# Patient Record
Sex: FEMALE | Race: ASIAN | NOT HISPANIC OR LATINO | Employment: UNEMPLOYED | URBAN - METROPOLITAN AREA
[De-identification: names, ages, dates, MRNs, and addresses within clinical notes are randomized per-mention and may not be internally consistent; named-entity substitution may affect disease eponyms.]

---

## 2020-09-04 ENCOUNTER — COMMUNICATION - HEALTHEAST (OUTPATIENT)
Dept: SCHEDULING | Facility: CLINIC | Age: 45
End: 2020-09-04

## 2020-09-24 ENCOUNTER — RECORDS - HEALTHEAST (OUTPATIENT)
Dept: MAMMOGRAPHY | Facility: CLINIC | Age: 45
End: 2020-09-24

## 2020-09-24 ENCOUNTER — OFFICE VISIT - HEALTHEAST (OUTPATIENT)
Dept: FAMILY MEDICINE | Facility: CLINIC | Age: 45
End: 2020-09-24

## 2020-09-24 DIAGNOSIS — Z23 NEED FOR TDAP VACCINATION: ICD-10-CM

## 2020-09-24 DIAGNOSIS — J45.20 MILD INTERMITTENT ASTHMA WITHOUT COMPLICATION: ICD-10-CM

## 2020-09-24 DIAGNOSIS — Z00.00 ENCOUNTER FOR MEDICAL EXAMINATION TO ESTABLISH CARE: ICD-10-CM

## 2020-09-24 DIAGNOSIS — Z12.31 VISIT FOR SCREENING MAMMOGRAM: ICD-10-CM

## 2020-09-24 DIAGNOSIS — Z23 NEED FOR IMMUNIZATION AGAINST INFLUENZA: ICD-10-CM

## 2020-09-24 DIAGNOSIS — Z13.220 LIPID SCREENING: ICD-10-CM

## 2020-09-24 DIAGNOSIS — E78.5 HYPERLIPIDEMIA, UNSPECIFIED HYPERLIPIDEMIA TYPE: ICD-10-CM

## 2020-09-24 DIAGNOSIS — Z13.1 SCREENING FOR DIABETES MELLITUS: ICD-10-CM

## 2020-09-24 DIAGNOSIS — B18.1 CHRONIC VIRAL HEPATITIS B WITHOUT DELTA AGENT AND WITHOUT COMA (H): ICD-10-CM

## 2020-09-24 DIAGNOSIS — Z12.31 ENCOUNTER FOR SCREENING MAMMOGRAM FOR MALIGNANT NEOPLASM OF BREAST: ICD-10-CM

## 2020-09-24 DIAGNOSIS — Z97.5 IUD (INTRAUTERINE DEVICE) IN PLACE: ICD-10-CM

## 2020-09-24 LAB
ALBUMIN SERPL-MCNC: 3.7 G/DL (ref 3.5–5)
ALP SERPL-CCNC: 76 U/L (ref 45–120)
ALT SERPL W P-5'-P-CCNC: 22 U/L (ref 0–45)
ANION GAP SERPL CALCULATED.3IONS-SCNC: 10 MMOL/L (ref 5–18)
AST SERPL W P-5'-P-CCNC: 19 U/L (ref 0–40)
BILIRUB SERPL-MCNC: 0.3 MG/DL (ref 0–1)
BUN SERPL-MCNC: 12 MG/DL (ref 8–22)
CALCIUM SERPL-MCNC: 9.4 MG/DL (ref 8.5–10.5)
CHLORIDE BLD-SCNC: 102 MMOL/L (ref 98–107)
CHOLEST SERPL-MCNC: 242 MG/DL
CO2 SERPL-SCNC: 27 MMOL/L (ref 22–31)
CREAT SERPL-MCNC: 0.76 MG/DL (ref 0.6–1.1)
ERYTHROCYTE [DISTWIDTH] IN BLOOD BY AUTOMATED COUNT: 11.4 % (ref 11–14.5)
FASTING STATUS PATIENT QL REPORTED: NO
GFR SERPL CREATININE-BSD FRML MDRD: >60 ML/MIN/1.73M2
GLUCOSE BLD-MCNC: 80 MG/DL (ref 70–125)
HBA1C MFR BLD: 5.2 %
HCT VFR BLD AUTO: 37 % (ref 35–47)
HDLC SERPL-MCNC: 53 MG/DL
HGB BLD-MCNC: 12.4 G/DL (ref 12–16)
LDLC SERPL CALC-MCNC: 145 MG/DL
MCH RBC QN AUTO: 29.5 PG (ref 27–34)
MCHC RBC AUTO-ENTMCNC: 33.6 G/DL (ref 32–36)
MCV RBC AUTO: 88 FL (ref 80–100)
PLATELET # BLD AUTO: 251 THOU/UL (ref 140–440)
PMV BLD AUTO: 8.6 FL (ref 7–10)
POTASSIUM BLD-SCNC: 3.7 MMOL/L (ref 3.5–5)
PROT SERPL-MCNC: 7.3 G/DL (ref 6–8)
RBC # BLD AUTO: 4.21 MILL/UL (ref 3.8–5.4)
SODIUM SERPL-SCNC: 139 MMOL/L (ref 136–145)
TRIGL SERPL-MCNC: 219 MG/DL
WBC: 6.3 THOU/UL (ref 4–11)

## 2020-09-24 ASSESSMENT — MIFFLIN-ST. JEOR: SCORE: 1285.8

## 2020-09-25 ENCOUNTER — HOSPITAL ENCOUNTER (OUTPATIENT)
Dept: MAMMOGRAPHY | Facility: CLINIC | Age: 45
Discharge: HOME | End: 2020-09-25
Attending: FAMILY MEDICINE

## 2020-09-25 DIAGNOSIS — N64.89 BREAST ASYMMETRY: ICD-10-CM

## 2020-10-21 ENCOUNTER — RECORDS - HEALTHEAST (OUTPATIENT)
Dept: ADMINISTRATIVE | Facility: OTHER | Age: 45
End: 2020-10-21

## 2021-03-22 ENCOUNTER — OFFICE VISIT - HEALTHEAST (OUTPATIENT)
Dept: FAMILY MEDICINE | Facility: CLINIC | Age: 46
End: 2021-03-22

## 2021-03-22 DIAGNOSIS — J45.20 MILD INTERMITTENT ASTHMA WITHOUT COMPLICATION: ICD-10-CM

## 2021-03-22 DIAGNOSIS — E78.5 HYPERLIPIDEMIA, UNSPECIFIED HYPERLIPIDEMIA TYPE: ICD-10-CM

## 2021-03-22 DIAGNOSIS — S16.1XXA STRAIN OF NECK MUSCLE, INITIAL ENCOUNTER: ICD-10-CM

## 2021-03-22 RX ORDER — ALBUTEROL SULFATE 90 UG/1
2 AEROSOL, METERED RESPIRATORY (INHALATION) EVERY 6 HOURS PRN
Qty: 1 EACH | Refills: 11 | Status: SHIPPED | OUTPATIENT
Start: 2021-03-22 | End: 2021-11-01

## 2021-03-22 RX ORDER — ENTECAVIR 0.5 MG/1
1 TABLET, FILM COATED ORAL DAILY
Status: SHIPPED | COMMUNITY
Start: 2021-01-21 | End: 2024-07-08

## 2021-03-22 RX ORDER — ATORVASTATIN CALCIUM 20 MG/1
20 TABLET, FILM COATED ORAL DAILY
Qty: 90 TABLET | Refills: 3 | Status: SHIPPED | OUTPATIENT
Start: 2021-03-22 | End: 2021-11-01

## 2021-03-22 RX ORDER — ACETAMINOPHEN 325 MG/1
650 TABLET ORAL EVERY 6 HOURS PRN
Qty: 100 TABLET | Refills: 3 | Status: SHIPPED | OUTPATIENT
Start: 2021-03-22 | End: 2022-12-28

## 2021-03-22 ASSESSMENT — MIFFLIN-ST. JEOR: SCORE: 1329.71

## 2021-05-27 ENCOUNTER — RECORDS - HEALTHEAST (OUTPATIENT)
Dept: ADMINISTRATIVE | Facility: OTHER | Age: 46
End: 2021-05-27

## 2021-05-27 LAB
ALT SERPL W/O P-5'-P-CCNC: 43 U/L (ref 0–78)
AST SERPL-CCNC: 18 U/L (ref 0–37)
CREAT SERPL-MCNC: 0.75 MG/DL (ref 0.6–1.02)

## 2021-05-28 ENCOUNTER — RECORDS - HEALTHEAST (OUTPATIENT)
Dept: ADMINISTRATIVE | Facility: OTHER | Age: 46
End: 2021-05-28

## 2021-05-28 ASSESSMENT — ASTHMA QUESTIONNAIRES: ACT_TOTALSCORE: 18

## 2021-06-02 ENCOUNTER — RECORDS - HEALTHEAST (OUTPATIENT)
Dept: ADMINISTRATIVE | Facility: OTHER | Age: 46
End: 2021-06-02

## 2021-06-05 VITALS
TEMPERATURE: 98.3 F | HEIGHT: 60 IN | RESPIRATION RATE: 20 BRPM | WEIGHT: 157.75 LBS | HEART RATE: 84 BPM | OXYGEN SATURATION: 96 % | BODY MASS INDEX: 30.97 KG/M2 | DIASTOLIC BLOOD PRESSURE: 70 MMHG | SYSTOLIC BLOOD PRESSURE: 114 MMHG

## 2021-06-05 VITALS
WEIGHT: 163.3 LBS | SYSTOLIC BLOOD PRESSURE: 110 MMHG | OXYGEN SATURATION: 97 % | HEIGHT: 61 IN | HEART RATE: 85 BPM | TEMPERATURE: 98 F | BODY MASS INDEX: 30.83 KG/M2 | DIASTOLIC BLOOD PRESSURE: 76 MMHG

## 2021-06-07 ENCOUNTER — HOSPITAL ENCOUNTER (OUTPATIENT)
Dept: ULTRASOUND IMAGING | Facility: HOSPITAL | Age: 46
Discharge: HOME | End: 2021-06-07
Attending: INTERNAL MEDICINE

## 2021-06-07 DIAGNOSIS — B18.1 CHRONIC HEPATITIS B WITHOUT DELTA AGENT WITHOUT HEPATIC COMA (H): ICD-10-CM

## 2021-06-11 NOTE — PROGRESS NOTES
"SUBJECTIVE  Say Berto is a 45 y.o. female here for:    Establish care  No concerns today  Moved from [place]- was there 10 years  Arrived to [place] 2009  Brings paper records in today  Pap smear 2/2018- normal per records  Mammo 3/2019 normal  ,  works   Has 2 sons  Patient stays at home with kids.  HLD- on atorvastatin 20 mg daily- she would like her cholesterol checked to see if she needs to continue this- was taking as needed?  Son cannot talk- developmental delay- goes to special school. Age 10 years  Other son- 12 years, doing well.  Paragard IUD placed 2/21/2014  She had lab work done 3/2020 including hepatitis B labs, CBC, CMP - this was all unremarkable.    She has chronic HBV- per records   On treatment with entecavir 0.5 mg daily, started January 2013.  Doing well  Liver biopsy done 2012 with grade 1/4 necroinflammatory changes and hepatic fibrosis stage 0/4  Liver US 7/2017 normal  Liver US 2/2018 normal  Abd US 12/15/18- normal appearing liver  Undetectable hep B viral DNA 3/2020  Has not yet established with hepatologist here    She has mild asthma- uses albuterol as needed      ROS  Complete 10 point review of systems negative except as noted above in HPI    Past Medical History:   Diagnosis Date     Asthma      Chronic hepatitis B (H)      Past Surgical History:   Procedure Laterality Date     negative       Family History   Problem Relation Age of Onset     Developmental delay Son      No Medical Problems Mother      Social History:  , 2 sons (one with severe developmental delay)  Homemaker  Born in [facility]- arrived to  2009  Denies tobacco, EtOH, drug use    OBJECTIVE  /70   Pulse 84   Temp 98.3  F (36.8  C) (Oral)   Resp 20   Ht 5' 0.24\" (1.53 m)   Wt 157 lb 12 oz (71.6 kg)   LMP 08/25/2020 (Approximate)   SpO2 96%   Breastfeeding No   BMI 30.57 kg/m       General: Cooperative, pleasant, in no acute distress  HEENT: NCAT, sclera clear, no icterus.  Neck: no " lymphadenopathy, no masses  CV: RRR, normal S1/S2, no murmur, rubs, gallops  Resp: No respiratory distress. Clear to auscultation bilaterally. No wheezes, rales, rhonchi  Abd: Soft, non-tender, no hepatosplenomegaly  Ext: No edema  Psych: Normal mood and affect    LABS & IMAGES   Results for orders placed or performed in visit on 09/24/20   HM2(CBC w/o Differential)   Result Value Ref Range    WBC 6.3 4.0 - 11.0 thou/uL    RBC 4.21 3.80 - 5.40 mill/uL    Hemoglobin 12.4 12.0 - 16.0 g/dL    Hematocrit 37.0 35.0 - 47.0 %    MCV 88 80 - 100 fL    MCH 29.5 27.0 - 34.0 pg    MCHC 33.6 32.0 - 36.0 g/dL    RDW 11.4 11.0 - 14.5 %    Platelets 251 140 - 440 thou/uL    MPV 8.6 7.0 - 10.0 fL   Comprehensive Metabolic Panel   Result Value Ref Range    Sodium 139 136 - 145 mmol/L    Potassium 3.7 3.5 - 5.0 mmol/L    Chloride 102 98 - 107 mmol/L    CO2 27 22 - 31 mmol/L    Anion Gap, Calculation 10 5 - 18 mmol/L    Glucose 80 70 - 125 mg/dL    BUN 12 8 - 22 mg/dL    Creatinine 0.76 0.60 - 1.10 mg/dL    GFR MDRD Af Amer >60 >60 mL/min/1.73m2    GFR MDRD Non Af Amer >60 >60 mL/min/1.73m2    Bilirubin, Total 0.3 0.0 - 1.0 mg/dL    Calcium 9.4 8.5 - 10.5 mg/dL    Protein, Total 7.3 6.0 - 8.0 g/dL    Albumin 3.7 3.5 - 5.0 g/dL    Alkaline Phosphatase 76 45 - 120 U/L    AST 19 0 - 40 U/L    ALT 22 0 - 45 U/L   Lipid [place]ascade RANDOM   Result Value Ref Range    Cholesterol 242 (H) <=199 mg/dL    Triglycerides 219 (H) <=149 mg/dL    HDL Cholesterol 53 >=50 mg/dL    LDL Calculated 145 (H) <=129 mg/dL    Patient Fasting > 8hrs? No    Glycosylated Hemoglobin A1c   Result Value Ref Range    Hemoglobin A1c 5.2 <=5.6 %         ASSESSMENT/PLAN:   Say was seen today for establish care and update on preventive health    Diagnoses and all orders for this visit:    Encounter for medical examination to establish care: Reviewed all paper records from [place]- [facility] scan to chart. Updated history in epic.    Need for immunization against influenza  -      Influenza, Seasonal Quad, PF =/> 6months    Need for Tdap vaccination  -     Tdap vaccine,  8yo or older,  IM    Mild intermittent asthma without complication: Mild symptoms, intermittent. Refilled beta agonist and reviewed when to use- she does not require controller inhaler.  -     albuterol (PROAIR HFA;PROVENTIL HFA;VENTOLIN HFA) 90 mcg/actuation inhaler; Inhale 2 puffs every 6 (six) hours as needed for wheezing.    Chronic viral hepatitis B without delta agent and without coma (H): Reviewed records- see problem list for details. Hep B e antigen positive- on antiviral. Will refer to [facility] for establishing care here in [place].  -     Ambulatory referral to Gastroenterology  -     HM2(CBC w/o Differential)  -     Comprehensive Metabolic Panel    IUD (intrauterine device) in place: Paragard IUD in place 2/21/14- happy with this method.    Hyperlipidemia, unspecified hyperlipidemia type: Refilled atorvastatin- check lipids. Encouraged daily exercise and healthy eating.  -     Lipid Cascade RANDOM  -     atorvastatin (LIPITOR) 20 MG tablet; Take 1 tablet (20 mg total) by mouth daily.    Visit for screening mammogram: Last mammogram per records 2019. No history of abnormal mammograms per patient.  -     Mammo Screening Bilateral; Future    Lipid screening  -     Lipid [place] RANDOM    Screening for diabetes mellitus: BMI 30- plan for diabetes screen today.  -     Glycosylated Hemoglobin A1c      Follow-up in 1 year for annual physical    Visit was completed along with Meera caldwell    Options for treatment and follow-up care were reviewed with the patient. Say Lar and/or guardian was engaged and actively involved in the decision making process. Say Lar and/or guardian verbalized understanding of the options discussed and was satisfied with the final plan.      Rosy Sheehan MD

## 2021-06-11 NOTE — TELEPHONE ENCOUNTER
Who is calling:  Pt  Reason for Call:  Pt needs an transportation scheduled  for 9/24/20 appt.     Has the patient been recently seen:  No, new pt  Okay to leave a detailed message: Yes

## 2021-06-14 ENCOUNTER — RECORDS - HEALTHEAST (OUTPATIENT)
Dept: HEALTH INFORMATION MANAGEMENT | Facility: CLINIC | Age: 46
End: 2021-06-14

## 2021-06-16 PROBLEM — Z97.5 IUD (INTRAUTERINE DEVICE) IN PLACE: Status: ACTIVE | Noted: 2020-09-24

## 2021-06-16 PROBLEM — E78.5 HLD (HYPERLIPIDEMIA): Status: ACTIVE | Noted: 2020-09-24

## 2021-06-16 PROBLEM — J45.20 MILD INTERMITTENT ASTHMA WITHOUT COMPLICATION: Status: ACTIVE | Noted: 2020-09-24

## 2021-06-16 PROBLEM — B18.1 CHRONIC VIRAL HEPATITIS B WITHOUT DELTA AGENT AND WITHOUT COMA (H): Status: ACTIVE | Noted: 2020-09-24

## 2021-06-16 NOTE — PROGRESS NOTES
"OUTPATIENT VISIT NOTE                                                   Date of Visit: 3/22/2021     Chief Complaint   Chief Complaint   Patient presents with     Medication check     Neck Pain         History of Present Illness   Say Berto is a 45 y.o. female needs refill of medications.  States her pharmacy told her she needs recheck.    She has had had some neck pain for the last few days.  No other symptoms with it.       MEDICATIONS   Current Outpatient Medications on File Prior to Visit   Medication Sig Dispense Refill     entecavir (BARACLUDE) 0.5 MG tablet Take 1 tablet by mouth daily.       [DISCONTINUED] albuterol (PROAIR HFA;PROVENTIL HFA;VENTOLIN HFA) 90 mcg/actuation inhaler Inhale 2 puffs every 6 (six) hours as needed for wheezing. 1 each 11     [DISCONTINUED] atorvastatin (LIPITOR) 20 MG tablet Take 1 tablet (20 mg total) by mouth daily. 90 tablet 3     No current facility-administered medications on file prior to visit.          SOCIAL HISTORY   Social History     Tobacco Use     Smoking status: Passive Smoke Exposure - Never Smoker     Smokeless tobacco: Current User     Tobacco comment:  smokes outside. Chews Betel nut w/o tobacco.   Substance Use Topics     Alcohol use: Never     Frequency: Never           Physical Exam   Vitals:    03/22/21 1159   BP: 110/76   Patient Site: Left Arm   Patient Position: Sitting   Cuff Size: Adult Regular   Pulse: 85   Temp: 98  F (36.7  C)   TempSrc: Oral   SpO2: 97%   Weight: 163 lb 4.8 oz (74.1 kg)   Height: 5' 1.42\" (1.56 m)        GEN:  NAD  LUNGS:  Clear to auscultation without wheezing.  Normal effort.  HEART:  RRR without murmur, rub or gallop   NECK:  nontender       Assessment and Plan   1. Mild intermittent asthma without complication  albuterol (PROAIR HFA;PROVENTIL HFA;VENTOLIN HFA) 90 mcg/actuation inhaler   2. Hyperlipidemia, unspecified hyperlipidemia type  atorvastatin (LIPITOR) 20 MG tablet   3. Strain of neck muscle, initial encounter  " acetaminophen (TYLENOL) 325 MG tablet         Refilled medications.  She has refills through September.  Recheck at that time with her primary.    Given tylenol to use for neck      Discussed signs / symptoms that warrant urgent / emergent medical attention.     Recheck if worsening or not improving.       Andres Renteria MD        Pertinent History     The following portions of the patient's history were reviewed and updated as appropriate: allergies, current medications, past family history, past medical history, past social history, past surgical history and problem list.

## 2021-06-21 NOTE — LETTER
Letter by Andres Renteria MD at      Author: Andres Renteria MD Service: -- Author Type: --    Filed:  Encounter Date: 3/22/2021 Status: (Other)             3/22/2021    To Whom It May Concern:    Say Berto  was unable to work/attend school 3/22/-3/23/2021.      Andres Renteria MD

## 2021-09-27 ENCOUNTER — ANCILLARY PROCEDURE (OUTPATIENT)
Dept: MAMMOGRAPHY | Facility: CLINIC | Age: 46
End: 2021-09-27
Attending: FAMILY MEDICINE
Payer: COMMERCIAL

## 2021-09-27 DIAGNOSIS — Z12.31 SCREENING MAMMOGRAM, ENCOUNTER FOR: ICD-10-CM

## 2021-09-27 PROCEDURE — 77067 SCR MAMMO BI INCL CAD: CPT | Mod: TC | Performed by: RADIOLOGY

## 2021-11-01 ENCOUNTER — OFFICE VISIT (OUTPATIENT)
Dept: FAMILY MEDICINE | Facility: CLINIC | Age: 46
End: 2021-11-01
Payer: COMMERCIAL

## 2021-11-01 VITALS
WEIGHT: 167.4 LBS | BODY MASS INDEX: 31.6 KG/M2 | SYSTOLIC BLOOD PRESSURE: 124 MMHG | TEMPERATURE: 98.9 F | DIASTOLIC BLOOD PRESSURE: 86 MMHG | HEIGHT: 61 IN | RESPIRATION RATE: 18 BRPM | HEART RATE: 77 BPM | OXYGEN SATURATION: 96 %

## 2021-11-01 DIAGNOSIS — Z13.0 SCREENING FOR DEFICIENCY ANEMIA: ICD-10-CM

## 2021-11-01 DIAGNOSIS — Z00.00 ROUTINE GENERAL MEDICAL EXAMINATION AT A HEALTH CARE FACILITY: ICD-10-CM

## 2021-11-01 DIAGNOSIS — Z76.89 ENCOUNTER TO ESTABLISH CARE WITH NEW DOCTOR: Primary | ICD-10-CM

## 2021-11-01 DIAGNOSIS — R00.2 HEART PALPITATIONS: ICD-10-CM

## 2021-11-01 DIAGNOSIS — B18.1 CHRONIC VIRAL HEPATITIS B WITHOUT DELTA AGENT AND WITHOUT COMA (H): ICD-10-CM

## 2021-11-01 DIAGNOSIS — R53.83 FATIGUE, UNSPECIFIED TYPE: ICD-10-CM

## 2021-11-01 DIAGNOSIS — Z23 NEED FOR PROPHYLACTIC VACCINATION AND INOCULATION AGAINST INFLUENZA: ICD-10-CM

## 2021-11-01 DIAGNOSIS — J45.20 MILD INTERMITTENT ASTHMA WITHOUT COMPLICATION: ICD-10-CM

## 2021-11-01 DIAGNOSIS — G47.09 OTHER INSOMNIA: ICD-10-CM

## 2021-11-01 DIAGNOSIS — Z13.21 ENCOUNTER FOR VITAMIN DEFICIENCY SCREENING: ICD-10-CM

## 2021-11-01 DIAGNOSIS — Z13.220 SCREENING FOR LIPID DISORDERS: ICD-10-CM

## 2021-11-01 DIAGNOSIS — Z71.85 VACCINE COUNSELING: ICD-10-CM

## 2021-11-01 DIAGNOSIS — Z13.29 SCREENING FOR THYROID DISORDER: ICD-10-CM

## 2021-11-01 LAB
ALBUMIN SERPL-MCNC: 3.7 G/DL (ref 3.5–5)
ALP SERPL-CCNC: 74 U/L (ref 45–120)
ALT SERPL W P-5'-P-CCNC: 25 U/L (ref 0–45)
ANION GAP SERPL CALCULATED.3IONS-SCNC: 10 MMOL/L (ref 5–18)
AST SERPL W P-5'-P-CCNC: 26 U/L (ref 0–40)
BILIRUB SERPL-MCNC: 0.5 MG/DL (ref 0–1)
BUN SERPL-MCNC: 8 MG/DL (ref 8–22)
CALCIUM SERPL-MCNC: 9.3 MG/DL (ref 8.5–10.5)
CHLORIDE BLD-SCNC: 105 MMOL/L (ref 98–107)
CHOLEST SERPL-MCNC: 207 MG/DL
CO2 SERPL-SCNC: 23 MMOL/L (ref 22–31)
CREAT SERPL-MCNC: 0.75 MG/DL (ref 0.6–1.1)
ERYTHROCYTE [DISTWIDTH] IN BLOOD BY AUTOMATED COUNT: 13 % (ref 10–15)
FASTING STATUS PATIENT QL REPORTED: ABNORMAL
GFR SERPL CREATININE-BSD FRML MDRD: >90 ML/MIN/1.73M2
GLUCOSE BLD-MCNC: 86 MG/DL (ref 70–125)
HCT VFR BLD AUTO: 39 % (ref 35–47)
HDLC SERPL-MCNC: 42 MG/DL
HGB BLD-MCNC: 12.6 G/DL (ref 11.7–15.7)
LDLC SERPL CALC-MCNC: 128 MG/DL
MCH RBC QN AUTO: 27.6 PG (ref 26.5–33)
MCHC RBC AUTO-ENTMCNC: 32.3 G/DL (ref 31.5–36.5)
MCV RBC AUTO: 86 FL (ref 78–100)
PLATELET # BLD AUTO: 248 10E3/UL (ref 150–450)
POTASSIUM BLD-SCNC: 3.7 MMOL/L (ref 3.5–5)
PROT SERPL-MCNC: 7.7 G/DL (ref 6–8)
RBC # BLD AUTO: 4.56 10E6/UL (ref 3.8–5.2)
SODIUM SERPL-SCNC: 138 MMOL/L (ref 136–145)
TRIGL SERPL-MCNC: 183 MG/DL
TSH SERPL DL<=0.005 MIU/L-ACNC: 1.54 UIU/ML (ref 0.3–5)
WBC # BLD AUTO: 5.8 10E3/UL (ref 4–11)

## 2021-11-01 PROCEDURE — 82306 VITAMIN D 25 HYDROXY: CPT | Performed by: STUDENT IN AN ORGANIZED HEALTH CARE EDUCATION/TRAINING PROGRAM

## 2021-11-01 PROCEDURE — 84443 ASSAY THYROID STIM HORMONE: CPT | Performed by: STUDENT IN AN ORGANIZED HEALTH CARE EDUCATION/TRAINING PROGRAM

## 2021-11-01 PROCEDURE — 80061 LIPID PANEL: CPT | Performed by: STUDENT IN AN ORGANIZED HEALTH CARE EDUCATION/TRAINING PROGRAM

## 2021-11-01 PROCEDURE — 90686 IIV4 VACC NO PRSV 0.5 ML IM: CPT | Performed by: STUDENT IN AN ORGANIZED HEALTH CARE EDUCATION/TRAINING PROGRAM

## 2021-11-01 PROCEDURE — 90471 IMMUNIZATION ADMIN: CPT | Performed by: STUDENT IN AN ORGANIZED HEALTH CARE EDUCATION/TRAINING PROGRAM

## 2021-11-01 PROCEDURE — 85027 COMPLETE CBC AUTOMATED: CPT | Performed by: STUDENT IN AN ORGANIZED HEALTH CARE EDUCATION/TRAINING PROGRAM

## 2021-11-01 PROCEDURE — 99396 PREV VISIT EST AGE 40-64: CPT | Mod: 25 | Performed by: STUDENT IN AN ORGANIZED HEALTH CARE EDUCATION/TRAINING PROGRAM

## 2021-11-01 PROCEDURE — 99213 OFFICE O/P EST LOW 20 MIN: CPT | Mod: 25 | Performed by: STUDENT IN AN ORGANIZED HEALTH CARE EDUCATION/TRAINING PROGRAM

## 2021-11-01 PROCEDURE — 36415 COLL VENOUS BLD VENIPUNCTURE: CPT | Performed by: STUDENT IN AN ORGANIZED HEALTH CARE EDUCATION/TRAINING PROGRAM

## 2021-11-01 PROCEDURE — 80053 COMPREHEN METABOLIC PANEL: CPT | Performed by: STUDENT IN AN ORGANIZED HEALTH CARE EDUCATION/TRAINING PROGRAM

## 2021-11-01 RX ORDER — HYDROXYZINE HYDROCHLORIDE 25 MG/1
25 TABLET, FILM COATED ORAL EVERY 6 HOURS PRN
Qty: 45 TABLET | Refills: 0 | Status: SHIPPED | OUTPATIENT
Start: 2021-11-01 | End: 2022-12-28

## 2021-11-01 RX ORDER — ALBUTEROL SULFATE 90 UG/1
2 AEROSOL, METERED RESPIRATORY (INHALATION) EVERY 6 HOURS PRN
Qty: 8.5 G | Refills: 1 | Status: SHIPPED | OUTPATIENT
Start: 2021-11-01 | End: 2022-12-28

## 2021-11-01 ASSESSMENT — MIFFLIN-ST. JEOR: SCORE: 1328.95

## 2021-11-01 NOTE — PATIENT INSTRUCTIONS
Plan for today:    1. Refill of asthma inhaler. Keep track of how often you are needing this     2. Call to schedule follow up with liver doctor (Dr Caban): [phone]             [address] [address]             [place]    Also ask Dr. Caban about colon cancer screening.    3. Follow up with me in 2 weeks to check IUD strings, follow up on asthma and review labs.    4. Hydroxyzine to help with sleep.       Preventive Health Recommendations  Female Ages 40 to 49    Yearly exam:     See your health care provider every year in order to  1. Review health changes.   2. Discuss preventive care.    3. Review your medicines if your doctor prescribed any.      Get a Pap test every three years (unless you have an abnormal result and your provider advises testing more often).      If you get Pap tests with HPV test, you only need to test every 5 years, unless you have an abnormal result. You do not need a Pap test if your uterus was removed (hysterectomy) and you have not had cancer.      You should be tested each year for STDs (sexually transmitted diseases), if you're at risk.     Ask your doctor if you should have a mammogram.      Have a colonoscopy (test for colon cancer) if someone in your family has had colon cancer or polyps before age 50.       Have a cholesterol test every 5 years.       Have a diabetes test (fasting glucose) after age 45. If you are at risk for diabetes, you should have this test every 3 years.    Shots: Get a flu shot each year. Get a tetanus shot every 10 years.     Nutrition:     Eat at least 5 servings of fruits and vegetables each day.    Eat whole-grain bread, whole-wheat pasta and brown rice instead of white grains and rice.    Get adequate Calcium and Vitamin D.      Lifestyle    Exercise at least 150 minutes a week (an average of 30 minutes a day, 5 days a week). This will help you control your weight and prevent  disease.    Limit alcohol to one drink per day.    No smoking.     Wear sunscreen to prevent skin cancer.    See your dentist every six months for an exam and cleaning.

## 2021-11-01 NOTE — PROGRESS NOTES
Preceptor Attestation:    I discussed the patient with the resident and evaluated the patient in person. I have verified the content of the note, which accurately reflects my assessment of the patient and the plan of care.   Supervising Physician:  Jordan Clemons MD.

## 2021-11-01 NOTE — PROGRESS NOTES
Female Physical Note    Concerns today:     Presents today to establish care.  Chose to transfer to our clinic as we still used in person interpreters, her prior care facility transition to only fall in which she fell more difficult.    Asthma:  Triggered by cold weather, no flares in summer.  She used to be on a controller inhaler but has only used her rescue for the past year.  Depending on her symptoms she uses 1 puff a day to 2 to 3 puffs a day.  She is only needed her inhaler 2 times in the past week but anticipates needing more frequent doses as the temperatures declined.  She has had shortness of breath and wheezing since childhood.  Unclear if she has had formal testing.    Hepatitis B:  Notes reviewed from prior PCP and [facility], Has chronic hepatitis B and follows with Stephania TARANGO.  She has not had any abdominal pain or fatigue.  Per chart review she had a liver biopsy done in 2012, normal liver ultrasounds in 2017, 2018 in 2021.  Stephania TARANGO has been keeping track of her viral load.  Currently on antiviral therapy without difficulty.  She has plenty of refills of this and plans to follow-up with Stephania TARANGO in December as recommended in their notes.    Fatigue:  Feels very tired throughout the day, she has a son who is nonverbal and severe ASD.  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  She tries to sleep when he sleeps but oftentimes worries and cannot fall asleep.    A Meera  was used for  this visit.     ROS:  CONSTITUTIONAL: fatigue as above, no unexpected change in weight  SKIN: no worrisome rashes, no worrisome moles, no worrisome lesions  EYES: no acute vision problems or changes  ENT: no ear problems, no mouth problems, no throat problems  RESP: see above  CV: see above  GI: no nausea, no vomiting, no constipation, no diarrhea. Hepatitis B as above  : no frequency, no dysuria, no hematuria  MS: no claudication, no myalgias, no joint aches  NEURO: no weakness,  no dizziness, no syncope, no headaches  ENDOCRINE: no temperature intolerance, no skin/hair changes  HEME: no easy bruising, no bleeding problems  PSYCHIATRIC: has trouble sleeping - stressed by son with ASD.    Sexually Active: Yes  Sexual concerns: No   Contraception:IUD   P:2  Menarche: Menstruating currently, has normal periods  STD History: Neg  Last Pap Smear Date: , normal  Abnormal Pap History: None    Patient Active Problem List   Diagnosis     Mild intermittent asthma without complication     Chronic viral hepatitis B without delta agent and without coma (H)     IUD (intrauterine device) in place     HLD (hyperlipidemia)       Current Outpatient Medications   Medication Sig Dispense Refill     acetaminophen (TYLENOL) 325 MG tablet [ACETAMINOPHEN (TYLENOL) 325 MG TABLET] Take 2 tablets (650 mg total) by mouth every 6 (six) hours as needed for pain. 100 tablet 3     albuterol (PROAIR HFA;PROVENTIL HFA;VENTOLIN HFA) 90 mcg/actuation inhaler [ALBUTEROL (PROAIR HFA;PROVENTIL HFA;VENTOLIN HFA) 90 MCG/ACTUATION INHALER] Inhale 2 puffs every 6 (six) hours as needed for wheezing. 1 each 11     entecavir (BARACLUDE) 0.5 MG tablet [ENTECAVIR (BARACLUDE) 0.5 MG TABLET] Take 1 tablet by mouth daily.       atorvastatin (LIPITOR) 20 MG tablet [ATORVASTATIN (LIPITOR) 20 MG TABLET] Take 1 tablet (20 mg total) by mouth daily. (Patient not taking: Reported on 2021) 90 tablet 3       Past Medical History:   Diagnosis Date     Asthma      Chronic hepatitis B (H)         Family History     Problem (# of Occurrences) Relation (Name,Age of Onset)    Developmental delay (1) Son    No Known Problems (1) Mother        Problem List Medication List and Allergy List were reviewed.    Patient is a new patient to this clinic and so  I reviewed/updated the Past Medical History, the Family History and the Social History. .    Social History     Tobacco Use     Smoking status: Passive Smoke Exposure - Never Smoker     Smokeless  tobacco: Current User     Tobacco comment:  smokes outside. Chews Betel nut w/o tobacco.   Substance Use Topics     Alcohol use: Never       Children ? yes - two children ages 13 and 11    Has anyone hurt you physically, for example by pushing, hitting, slapping or kicking you or forcing you to have sex? Denies  Do you feel threatened or controlled by a partner, ex-partner or anyone in your life? Denies    RISK BEHAVIORS AND HEALTHY HABITS:  Tobacco Use/Smoking: None  Illicit Drug Use: None  Do you use alcohol? No  Diet (5-7 servings of fruits/veg daily): Yes   Exercise (30 min accumulated most days):Yes  Dental Care: Yes   Calcium 1500 mg/d:  Yes  Seat Belt Use: Yes     Immunization History   Administered Date(s) Administered     Influenza Vaccine IM > 6 months Valent IIV4 (Alfuria,Fluzone) 09/24/2020     Tdap (Adacel,Boostrix) 09/24/2020    Reviewed Immunization Record Today    EXAMINATION:   There were no vitals taken for this visit.  GENERAL: healthy, alert and no distress  EYES: Eyes grossly normal to inspection, extraocular movements - intact, and PERRL  HENT: ear canals- normal; TMs- normal; Nose- normal; Mouth- no ulcers, no lesions  NECK: no tenderness, no adenopathy, no asymmetry, no masses, no stiffness; thyroid- normal to palpation  RESP: lungs clear to auscultation - no rales, no rhonchi, no wheezes  BREAST: no masses, no tenderness, no nipple discharge, no palpable axillary masses or adenopathy  CV: regular rates and rhythm, normal S1 S2, no S3 or S4 and no murmur, no click or rub -  ABDOMEN: soft, no tenderness, no  hepatosplenomegaly, no masses, normal bowel sounds  MS: extremities- no gross deformities noted, no edema  SKIN: no suspicious lesions, no rashes  NEURO: strength and tone- normal, sensory exam- grossly normal, mentation- intact, speech- normal, reflexes- symmetric  BACK: no CVA tenderness, no paralumbar tenderness  PSYCH: Alert and oriented times 3; speech- coherent , normal  rate and volume; able to articulate logical thoughts, able to abstract reason, no tangential thoughts, no hallucinations or delusions, affect- normal  LYMPHATICS: ant. cervical- normal, post. cervical- normal, axillary- normal, supraclavicular- normal, inguinal- normal    ASSESSMENT AND PLAN:    Encounter to establish care with new doctor  Routine general medical examination at a health care facility  Care established today, history reviewed.  Updated chart as able.  Remaining plan as below.    Screening for lipid disorders   History of hyperlipidemia, prior on atorvastatin 20 mg but has not been taking this for the past year.  We will recheck lipids today and likely reinitiate statin if indicated  - Lipid Profile; Future  - Lipid Profile    Need for prophylactic vaccination and inoculation against influenza  - INFLUENZA VACCINE IM > 6 MONTHS VALENT IIV4 (AFLURIA/FLUZONE)    Mild intermittent asthma without complication  Albuterol refilled today, encouraged her to come back in the next 2 to 4 weeks to reassess asthma control with ACT -per guidelines to consider initiating controller.  - albuterol (PROAIR HFA/PROVENTIL HFA/VENTOLIN HFA) 108 (90 Base) MCG/ACT inhaler; Inhale 2 puffs into the lungs every 6 hours as needed for shortness of breath / dyspnea or wheezing  Dispense: 8.5 g; Refill: 1    Vaccine counseling  Discussed COVID-19 vaccine today.  Patient is interested but does not want to have this today.  Discussed its recommendations and that I recommend she come back to clinic for administration ASAP.    Chronic viral hepatitis B without delta agent and without coma (H)  Follows with [facility], recommend follow-up in December 2021 as noted in their records.  We will recheck CMP today in setting of fatigue.  - Comprehensive metabolic panel; Future  - Comprehensive metabolic panel    Encounter for vitamin deficiency screening  - Vitamin D Deficiency; Future  - Vitamin D Deficiency    Screening for deficiency  anemia  - CBC with platelets; Future  - CBC with platelets    Screening for thyroid disorder  Heart palpitations  Fatigue, unspecified type  Suspect palpitations are secondary to anxiety and albuterol use but will evaluate TSH in setting of fatigue  - TSH with free T4 reflex; Future  - TSH with free T4 reflex    Other insomnia  Endorses significant stressors at home including special needs child.  Declines mental health referral or any behavioral intervention today.  I do think she would benefit from both psychotherapy and lifestyle clinic to discuss sleep hygiene.  She request something to help as needed when she can take a nap.  Will start hydroxyzine at a low-dose and continue to work on furthering the conversation around anxiety management.  - hydrOXYzine (ATARAX) 25 MG tablet; Take 1 tablet (25 mg) by mouth every 6 hours as needed for anxiety or other (sleep)  Dispense: 45 tablet; Refill: 0      Belinda Carlin MD PGY3  [facility]    Seen and discussed with Dr. Jordan Clemons

## 2021-11-01 NOTE — NURSING NOTE
Due to patient being non-English speaking/uses sign language, an  was used for this visit. Only for face-to-face interpretation by an external agency, date and length of interpretation can be found on the scanned worksheet.     name: DAYSI MONROY   Agency: [employer]  Language: Fly   Telephone number:   Type of interpretation: Face-to-face, spoken

## 2021-11-02 LAB — DEPRECATED CALCIDIOL+CALCIFEROL SERPL-MC: 12 UG/L (ref 30–80)

## 2021-11-03 NOTE — RESULT ENCOUNTER NOTE
Per patient request, discuss lab results at 11/15 appointment. Will initiate vit D supplementation at that time.    Belinda Carlin MD

## 2021-11-15 ENCOUNTER — OFFICE VISIT (OUTPATIENT)
Dept: FAMILY MEDICINE | Facility: CLINIC | Age: 46
End: 2021-11-15
Payer: COMMERCIAL

## 2021-11-15 VITALS
SYSTOLIC BLOOD PRESSURE: 135 MMHG | HEART RATE: 91 BPM | WEIGHT: 169 LBS | RESPIRATION RATE: 16 BRPM | OXYGEN SATURATION: 98 % | BODY MASS INDEX: 32.45 KG/M2 | TEMPERATURE: 97.9 F | DIASTOLIC BLOOD PRESSURE: 86 MMHG

## 2021-11-15 DIAGNOSIS — E55.9 VITAMIN D INSUFFICIENCY: ICD-10-CM

## 2021-11-15 DIAGNOSIS — J45.20 MILD INTERMITTENT ASTHMA WITHOUT COMPLICATION: Primary | ICD-10-CM

## 2021-11-15 DIAGNOSIS — Z97.5 IUD (INTRAUTERINE DEVICE) IN PLACE: ICD-10-CM

## 2021-11-15 PROCEDURE — 99214 OFFICE O/P EST MOD 30 MIN: CPT | Mod: GC | Performed by: STUDENT IN AN ORGANIZED HEALTH CARE EDUCATION/TRAINING PROGRAM

## 2021-11-15 RX ORDER — BUDESONIDE AND FORMOTEROL FUMARATE DIHYDRATE 80; 4.5 UG/1; UG/1
AEROSOL RESPIRATORY (INHALATION)
Qty: 10.2 G | Refills: 11 | Status: SHIPPED | OUTPATIENT
Start: 2021-11-15 | End: 2022-12-28

## 2021-11-15 RX ORDER — VITAMIN B COMPLEX
1 TABLET ORAL DAILY
Qty: 90 TABLET | Refills: 3 | Status: SHIPPED | OUTPATIENT
Start: 2021-11-15 | End: 2024-07-08

## 2021-11-15 NOTE — PATIENT INSTRUCTIONS
Try the Symbicort inhaler when you are short of breath. This is to replace the red albuterol inhaler.     Take vitamin D tablet daily for vitamin D insufficiency.    Recheck in 3 months.

## 2021-11-15 NOTE — PROGRESS NOTES
[facility] Clinic Visit    Assessment & Plan     1. Mild intermittent asthma without complication  Discussed new guidelines including STAR therapy.  She is open to trying a new PRN rescue inhaler.   - budesonide-formoterol (SYMBICORT) 80-4.5 MCG/ACT Inhaler; Use 1-2 puffs as needed, up to 12 puffs per day.  Dispense: 10.2 g; Refill: 11    2. Vitamin D insufficiency  - Vitamin D3 (CHOLECALCIFEROL) 25 mcg (1000 units) tablet; Take 1 tablet (25 mcg) by mouth daily  Dispense: 90 tablet; Refill: 3    3. IUD (intrauterine device) in place  Strings visualized today, will remove in 2024.    Options for treatment and follow-up care were reviewed with the patient who was engaged and actively involved in the decision making process, verbalized understanding of the options discussed, and satisfied with the final plan.    Patient was staffed with supervising physician, Dr. Mott, who agrees with the assessment and plan.    Belinda Carlin MD, PGY3  [facility]    Patient Instructions   Try the Symbicort inhaler when you are short of breath. This is to replace the red albuterol inhaler.     Take vitamin D tablet daily for vitamin D insufficiency.    Recheck in 3 months.         Subjective   Say Berto is a 46 year old female who comes to clinic to follow up on asthma and labs.    Chief Complaints and History of Present Illnesses   Patient presents with     RECHECK     Follow up from last visit      Asthma:  Overall controlled now that she has an inhaler again. She  feels the red proair inhaler is better than the one she had previously, unclear if this was a controller versus a different albuterol.  She uses it several times a week when she is outside or doing physical activity.  Currently is not using a controller inhaler.    Fatigue/Sleep/Social:  Started hydroxyzine to help her with sleep.  This is helping significantly, she does not have any concerns regarding sleep hygiene today.  However,  she still has several concerns about her autistic son.  The family has a PCA that helps in 6 hours a week but she is very exhausted when she does not have help at the home.  She is concerned about his future and would like help scheduling his follow-up appointments.    Lab results:  Here to follow-up on laboratory results obtained at last visit.  Her transaminases are within normal limits, she continues to follow with [facility] for hepatology with her chronic hepatitis B.  Her lipids were elevated but per ASCVD below does not qualify for statin, she was previously on a statin and this was discontinued due to this low score.  Her vitamin D level was low and we discussed that this may be contributing to her fatigue.    IUD:  Lastly would like her IUD strings checked, was placed in 2014 ParaGard and is due out in 3 years.  She just wants to make sure the strings of where she cannot feel them.    The 10-year ASCVD risk score (Corwin FARFAN Jr., et al., 2013) is: 1.6%    Values used to calculate the score:      Age: 46 years      Sex: Female      Is Non- : No      Diabetic: No      Tobacco smoker: No      Systolic Blood Pressure: 135 mmHg      Is BP treated: No      HDL Cholesterol: 42 mg/dL      Total Cholesterol: 207 mg/dL    Patient Active Problem List    Diagnosis Date Noted     Mild intermittent asthma without complication 09/24/2020     Priority: Medium     Uses beta agonist prn. Not on controller inhaler.         Chronic viral hepatitis B without delta agent and without coma (H) 09/24/2020     Priority: Medium     Chronic hep B. Hep Be antigen positive- started on entecavir 0.5 mg daily   Jan 2013. Undetectable viral load. Liver biopsy done 2012 with grade 1/4   necroinflammatory changes and hepatic fibrosis stage 0/4. Followed with   hepatologist in [place]- referred to [facility] on arrival to [place].         IUD (intrauterine device) in place 09/24/2020     Priority: Medium     Paragard IUD  placed in [place] 2/21/14, due for removal 2/21/24.         HLD (hyperlipidemia) 09/24/2020     Priority: Medium     On atorvastatin 20 mg daily         Current Outpatient Medications   Medication Instructions     acetaminophen (TYLENOL) 650 mg, Oral, EVERY 6 HOURS PRN     albuterol (PROAIR HFA/PROVENTIL HFA/VENTOLIN HFA) 108 (90 Base) MCG/ACT inhaler 2 puffs, Inhalation, EVERY 6 HOURS PRN     entecavir (BARACLUDE) 0.5 MG tablet 1 tablet, DAILY     hydrOXYzine (ATARAX) 25 mg, Oral, EVERY 6 HOURS PRN     Social: She reports that she is a non-smoker but has been exposed to tobacco smoke. She uses smokeless tobacco. She reports that she does not drink alcohol and does not use drugs.    Nursing Notes:   Karissa Diaz CMA  11/15/2021  9:43 AM  Signed  Due to patient being non-English speaking/uses sign language, an  was used for this visit. Only for face-to-face interpretation by an external agency, date and length of interpretation can be found on the scanned worksheet.       name: Marlo Wright  Language: Fly  Agency:  [employer]  Telephone number: [phone]  Type of interpretation:  Face-to-face, spoken      Objective     Vitals:    11/15/21 0941   BP: 135/86   BP Location: Left arm   Patient Position: Sitting   Cuff Size: Adult Regular   Pulse: 91   Resp: 16   Temp: 97.9  F (36.6  C)   TempSrc: Oral   SpO2: 98%   Weight: 76.7 kg (169 lb)     Body mass index is 32.45 kg/m .    GEN: NAD, healthy, alert  HEENT: NC/AT, EOMI, normal conjunctivae/sclerae, clear oropharynx, MMM  RESP: CTAB, no w/r/r  CV: RRR, nl S1/S2, no m/r/g, no peripheral edema  ABD: soft, NT/ND, +BS throughout  MSK: no MSK defects noted, gait is age appropriate w/o ataxia  : Vaginal tissue pink and moist, cervix easily visualized with 2 IUD strings at appropriate length.  SKIN: no suspicious lesions or rashes  NEURO: no obvious focal deficits  PSYCH: mentation appears normal, affect normal/bright    Labs:  Office Visit  on 11/01/2021   Component Date Value Ref Range Status     Cholesterol 11/01/2021 207* <=199 mg/dL Final     Triglycerides 11/01/2021 183* <=149 mg/dL Final     Direct Measure HDL 11/01/2021 42* >=50 mg/dL Final    HDL Cholesterol Reference Range:     0-2 years:   No reference ranges established for patients under 2 years old  at [facility] for lipid analytes.    2-8 years:  Greater than 45 mg/dL     18 years and older:   Female: Greater than or equal to 50 mg/dL   Male:   Greater than or equal to 40 mg/dL     LDL Cholesterol Calculated 11/01/2021 128  <=129 mg/dL Final     Patient Fasting > 8hrs? 11/01/2021 Unknown   Final     WBC Count 11/01/2021 5.8  4.0 - 11.0 10e3/uL Final     RBC Count 11/01/2021 4.56  3.80 - 5.20 10e6/uL Final     Hemoglobin 11/01/2021 12.6  11.7 - 15.7 g/dL Final     Hematocrit 11/01/2021 39.0  35.0 - 47.0 % Final     MCV 11/01/2021 86  78 - 100 fL Final     MCH 11/01/2021 27.6  26.5 - 33.0 pg Final     MCHC 11/01/2021 32.3  31.5 - 36.5 g/dL Final     RDW 11/01/2021 13.0  10.0 - 15.0 % Final     Platelet Count 11/01/2021 248  150 - 450 10e3/uL Final     Sodium 11/01/2021 138  136 - 145 mmol/L Final     Potassium 11/01/2021 3.7  3.5 - 5.0 mmol/L Final     Chloride 11/01/2021 105  98 - 107 mmol/L Final     Carbon Dioxide (CO2) 11/01/2021 23  22 - 31 mmol/L Final     Anion Gap 11/01/2021 10  5 - 18 mmol/L Final     Urea Nitrogen 11/01/2021 8  8 - 22 mg/dL Final     Creatinine 11/01/2021 0.75  0.60 - 1.10 mg/dL Final     Calcium 11/01/2021 9.3  8.5 - 10.5 mg/dL Final     Glucose 11/01/2021 86  70 - 125 mg/dL Final     Alkaline Phosphatase 11/01/2021 74  45 - 120 U/L Final     AST 11/01/2021 26  0 - 40 U/L Final     ALT 11/01/2021 25  0 - 45 U/L Final     Protein Total 11/01/2021 7.7  6.0 - 8.0 g/dL Final     Albumin 11/01/2021 3.7  3.5 - 5.0 g/dL Final     Bilirubin Total 11/01/2021 0.5  0.0 - 1.0 mg/dL Final     GFR Estimate 11/01/2021 >90  >60 mL/min/1.73m2 Final    As of July 11,  2021, eGFR is calculated by the CKD-EPI creatinine equation, without race adjustment. eGFR can be influenced by muscle mass, exercise, and diet. The reported eGFR is an estimation only and is only applicable if the renal function is stable.     Vitamin D, Total (25-Hydroxy) 11/01/2021 12* 30 - 80 ug/L Final     TSH 11/01/2021 1.54  0.30 - 5.00 uIU/mL Final

## 2021-11-15 NOTE — PROGRESS NOTES
Preceptor Attestation:  Vitals:    11/15/21 0941   BP: 135/86   BP Location: Left arm   Patient Position: Sitting   Cuff Size: Adult Regular   Pulse: 91   Resp: 16   Temp: 97.9  F (36.6  C)   TempSrc: Oral   SpO2: 98%   Weight: 76.7 kg (169 lb)          I discussed the patient with the resident and evaluated the patient in person. I have verified the content of the note, which accurately reflects my assessment of the patient and the plan of care.   Supervising Physician:  Leticia Mott MD

## 2021-11-15 NOTE — NURSING NOTE
Due to patient being non-English speaking/uses sign language, an  was used for this visit. Only for face-to-face interpretation by an external agency, date and length of interpretation can be found on the scanned worksheet.       name: Marlo Wright  Language: Trinidadian  Agency:  [employer]  Telephone number: [phone]  Type of interpretation:  Face-to-face, spoken

## 2021-11-29 ENCOUNTER — TRANSFERRED RECORDS (OUTPATIENT)
Dept: HEALTH INFORMATION MANAGEMENT | Facility: CLINIC | Age: 46
End: 2021-11-29
Payer: COMMERCIAL

## 2022-01-11 ENCOUNTER — TRANSFERRED RECORDS (OUTPATIENT)
Dept: HEALTH INFORMATION MANAGEMENT | Facility: CLINIC | Age: 47
End: 2022-01-11
Payer: COMMERCIAL

## 2022-01-11 LAB — INR (EXTERNAL): 0.9 (ref 0.9–1.2)

## 2022-01-12 ENCOUNTER — OFFICE VISIT (OUTPATIENT)
Dept: FAMILY MEDICINE | Facility: CLINIC | Age: 47
End: 2022-01-12
Payer: COMMERCIAL

## 2022-01-12 VITALS
HEART RATE: 80 BPM | OXYGEN SATURATION: 97 % | WEIGHT: 167.7 LBS | TEMPERATURE: 98.6 F | SYSTOLIC BLOOD PRESSURE: 119 MMHG | RESPIRATION RATE: 20 BRPM | BODY MASS INDEX: 32.2 KG/M2 | DIASTOLIC BLOOD PRESSURE: 85 MMHG

## 2022-01-12 DIAGNOSIS — J02.9 SORE THROAT: Primary | ICD-10-CM

## 2022-01-12 LAB
DEPRECATED S PYO AG THROAT QL EIA: NEGATIVE
FLUAV AG SPEC QL IA: NEGATIVE
FLUBV AG SPEC QL IA: NEGATIVE
GROUP A STREP BY PCR: NOT DETECTED

## 2022-01-12 PROCEDURE — 99000 SPECIMEN HANDLING OFFICE-LAB: CPT | Performed by: STUDENT IN AN ORGANIZED HEALTH CARE EDUCATION/TRAINING PROGRAM

## 2022-01-12 PROCEDURE — 87804 INFLUENZA ASSAY W/OPTIC: CPT | Performed by: STUDENT IN AN ORGANIZED HEALTH CARE EDUCATION/TRAINING PROGRAM

## 2022-01-12 PROCEDURE — 99213 OFFICE O/P EST LOW 20 MIN: CPT | Mod: GC | Performed by: STUDENT IN AN ORGANIZED HEALTH CARE EDUCATION/TRAINING PROGRAM

## 2022-01-12 PROCEDURE — 87651 STREP A DNA AMP PROBE: CPT | Performed by: STUDENT IN AN ORGANIZED HEALTH CARE EDUCATION/TRAINING PROGRAM

## 2022-01-12 PROCEDURE — U0005 INFEC AGEN DETEC AMPLI PROBE: HCPCS | Mod: 90 | Performed by: STUDENT IN AN ORGANIZED HEALTH CARE EDUCATION/TRAINING PROGRAM

## 2022-01-12 PROCEDURE — U0003 INFECTIOUS AGENT DETECTION BY NUCLEIC ACID (DNA OR RNA); SEVERE ACUTE RESPIRATORY SYNDROME CORONAVIRUS 2 (SARS-COV-2) (CORONAVIRUS DISEASE [COVID-19]), AMPLIFIED PROBE TECHNIQUE, MAKING USE OF HIGH THROUGHPUT TECHNOLOGIES AS DESCRIBED BY CMS-2020-01-R: HCPCS | Mod: 90 | Performed by: STUDENT IN AN ORGANIZED HEALTH CARE EDUCATION/TRAINING PROGRAM

## 2022-01-12 NOTE — PROGRESS NOTES
[facility] - Office Visit    ASSESSMENT & PLAN     Sore throat  Pt has 2 days of sore throat.  Able to eat and drink okay without any issues.  Asymptomatic otherwise, but given concern for covid and initially told staff she had a cough, tested for covid, flu and strep.  Exam was completely normal- expect negative results.  -     Symptomatic; Yes COVID-19 Virus (Coronavirus) by PCR Nose  -     Streptococcus A Rapid Scr w Reflx to PCR  -     Influenza A & B Antigen - Clinic Collect  -     Group A Streptococcus PCR Throat Swab  - continue conservative management    Follow-up: Return if symptoms worsen or fail to improve.    The patient was seen by, and discussed with, Dr. Alfa Rahman who agrees with the plan.  -----  Sindi Villaseñor MD  PGY-3  [facility]       SUBJECTIVE       Say Lar is a 46 year old  female who presents to clinic today for the following:    Pt has had sore throat since 2 days. Able to eat and drink well.  No change in appetite.   No rhinorrhea, congestion, shortness of breath, chest discomfort, nausea, or vomiting.  No other GI symptoms or  symptoms.  No known exposures to COVID or any other illness.      ROS: 10 point ROS neg other than the symptoms noted above in the HPI.        OBJECTIVE   /85 (BP Location: Left arm, Patient Position: Sitting, Cuff Size: Adult Regular)   Pulse 80   Temp 98.6  F (37  C) (Oral)   Resp 20   Wt 76.1 kg (167 lb 11.2 oz)   SpO2 97%   BMI 32.20 kg/m       Physical Exam  Constitutional:       General: She is not in acute distress.     Appearance: She is not ill-appearing.   HENT:      Mouth/Throat:      Dentition: Abnormal dentition. Dental caries present.      Pharynx: Oropharynx is clear. Uvula midline. No oropharyngeal exudate, posterior oropharyngeal erythema or uvula swelling.      Comments: Brown stained teeth and tongue - betel nut  Cardiovascular:      Rate and Rhythm: Normal rate and regular rhythm.      Pulses: Normal  pulses.      Heart sounds: Normal heart sounds.   Pulmonary:      Effort: Pulmonary effort is normal. No respiratory distress.      Breath sounds: Normal breath sounds.   Skin:     General: Skin is warm and dry.   Neurological:      Mental Status: She is alert.   Psychiatric:         Mood and Affect: Mood normal.

## 2022-01-13 LAB — SARS-COV-2 RNA RESP QL NAA+PROBE: DETECTED

## 2022-01-14 ENCOUNTER — TELEPHONE (OUTPATIENT)
Dept: NURSING | Facility: CLINIC | Age: 47
End: 2022-01-14
Payer: COMMERCIAL

## 2022-01-14 NOTE — TELEPHONE ENCOUNTER
"Coronavirus (COVID-19) Notification    Caller Name (Patient, parent, daughter/son, grandparent, etc)  Say Berto with [employer]     Reason for call  Notify of Positive Coronavirus (COVID-19) lab results, assess symptoms,  review  [facility] recommendations    Lab Result    Lab test:  2019-nCoV rRt-PCR or SARS-CoV-2 PCR    Oropharyngeal AND/OR nasopharyngeal swabs is POSITIVE for 2019-nCoV RNA/SARS-COV-2 PCR (COVID-19 virus)    Below information have not been discussed to pt. Pt states she already received a call and was told to stay home. No further questions at this time.    RN Recommendations/Instructions per [facility] Coronavirus COVID-19 recommendations    Brief introduction script  Introduce self then review script:  \"I am calling on behalf of [employer].  We were notified that your Coronavirus test (COVID-19) for was POSITIVE for the virus.  I have some information to relay to you but first I wanted to mention that the [place] Dept of Health will be contacting you shortly [it's possible MD already called Patient] to talk to you more about how you are feeling and other people you have had contact with who might now also have the virus.  Also,  [facility] is Partnering with the [facility] for Covid-19 research, you may be contacted directly by research staff.\"    Assessment (Inquire about Patient's current symptoms)   Assessment   Current Symptoms at time of phone call: (if no symptoms, document No symptoms] -   Symptoms onset (if applicable) -     If at time of call, Patients symptoms hare worsened, the Patient should contact 911 or have someone drive them to Emergency Dept promptly:      If Patient calling 911, inform 911 personal that you have tested positive for the Coronavirus (COVID-19).  Place mask on and await 911 to arrive.    If Emergency Dept, If possible, please have another adult drive you to the Emergency Dept but you need to wear mask when in contact with other " people.      Monoclonal Antibody Administration    You may be eligible to receive a new treatment with a monoclonal antibody for preventing hospitalization in patients at high risk for complications from COVID-19.   This medication is still experimental and available on a limited basis; it is given through an IV and must be given at an infusion center. Please note that not all people who are eligible will receive the medication since it is in limited supply.     Are you interested in being considered for this medication?    Does the patient fit the criteria:     Review information with Patient    Your result was positive. This means you have COVID-19 (coronavirus).  We have sent you a letter that reviews the information that I'll be reviewing with you now.    How can I protect others?    If you have symptoms: stay home and away from others (self-isolate) until:    You've had no fever--and no medicine that reduces fever--for 1 full day (24 hours). And       Your other symptoms have gotten better. For example, your cough or breathing has improved. And     At least 10 days have passed since your symptoms started. (If you've been told by a doctor that you have a weak immune system, wait 20 days.)     If you don't have symptoms: Stay home and away from others (self-isolate) until at least 10 days have passed since your first positive COVID-19 test. (Date test collected)    During this time:    Stay in your own room, including for meals. Use your own bathroom if you can.    Stay away from others in your home. No hugging, kissing or shaking hands. No visitors.     Don't go to work, school or anywhere else.     Clean  high touch  surfaces often (doorknobs, counters, handles, etc.). Use a household cleaning spray or wipes. You'll find a full list on the EPA website at www.epa.gov/pesticide-registration/list-n-disinfectants-use-against-sars-cov-2.     Cover your mouth and nose with a mask, tissue or other face covering to  avoid spreading germs.    Wash your hands and face often with soap and water.    Make a list of people you have been in close contact with recently, even if either of you wore a face covering.   - Start your list from 2 days before you became ill or had a positive test.  - Include anyone that was within 6 feet of you for a cumulative total of 15 minutes or more in 24 hours. (Example: if you sat next to Luke for 5 minutes in the morning and 10 minutes in the afternoon, then you were in close contact for 15 minutes total that day. Luke would be added to your list.)    A public health worker will call or text you. It is important that you answer. They will ask you questions about possible exposures to COVID-19, such as people you have been in direct contact with and places you have visited.    Tell the people on your list that you have COVID-19; they should stay away from others for 14 days starting from the last time they were in contact with you (unless you are told something different from a public health worker).     Caregivers in these groups are at risk for severe illness due to COVID-19:  o People 65 years and older  o People who live in a nursing home or long-term care facility  o People with chronic disease (lung, heart, cancer, diabetes, kidney, liver, immunologic)  o People who have a weakened immune system, including those who:  - Are in cancer treatment  - Take medicine that weakens the immune system, such as corticosteroids  - Had a bone marrow or organ transplant  - Have an immune deficiency  - Have poorly controlled HIV or AIDS  - Are obese (body mass index of 40 or higher)  - Smoke regularly    Caregivers should wear gloves while washing dishes, handling laundry and cleaning bedrooms and bathrooms.    Wash and dry laundry with special caution. Don't shake dirty laundry, and use the warmest water setting you can.    If you have a weakened immune system, ask your doctor about other actions you should  take.    For more tips, go to www.cdc.gov/coronavirus/2019-ncov/downloads/10Things.pdf.    You should not go back to work until you meet the guidelines above for ending your home isolation. You don't need to be retested for COVID-19 before going back to work--studies show that you won't spread the virus if it's been at least 10 days since your symptoms started (or 20 days, if you have a weak immune system).    Employers: This document serves as formal notice of your employee's medical guidelines for going back to work. They must meet the above guidelines before going back to work in person.    How can I take care of myself?    1. Get lots of rest. Drink extra fluids (unless a doctor has told you not to).    2. Take Tylenol (acetaminophen) for fever or pain. If you have liver or kidney problems, ask your family doctor if it's okay to take Tylenol.     Take either:     650 mg (two 325 mg pills) every 4 to 6 hours, or     1,000 mg (two 500 mg pills) every 8 hours as needed.     Note: Don't take more than 3,000 mg in one day. Acetaminophen is found in many medicines (both prescribed and over-the-counter medicines). Read all labels to be sure you don't take too much.    For children, check the Tylenol bottle for the right dose (based on their age or weight).    3. If you have other health problems (like cancer, heart failure, an organ transplant or severe kidney disease): Call your specialty clinic if you don't feel better in the next 2 days.    4. Know when to call 911: Emergency warning signs include:    Trouble breathing or shortness of breath    Pain or pressure in the chest that doesn't go away    Feeling confused like you haven't felt before, or not being able to wake up    Bluish-colored lips or face    5. Sign up for [employer]. We know it's scary to hear that you have COVID-19. We want to track your symptoms to make sure you're okay over the next 2 weeks. Please look for an email from [employer]--this is a  free, online program that we'll use to keep in touch. To sign up, follow the link in the email. Learn more at [email or web address].    Where can I get more information?    [facility]: [email or web address]    Coronavirus Basics: www.health.[facility]./diseases/coronavirus/basics.html    What to Do If You're Sick: www.cdc.gov/coronavirus/2019-ncov/about/steps-when-sick.html    Ending Home Isolation: www.cdc.gov/coronavirus/2019-ncov/hcp/disposition-in-home-patients.html     Caring for Someone with COVID-19: www.cdc.gov/coronavirus/2019-ncov/if-you-are-sick/care-for-someone.html     [facility] clinical trials (COVID-19 research studies): [email or web address]-[email or web address]     A Positive COVID-19 letter will be sent via [employer] or the mail. (Exception, no letters sent to Presurgerical/Preprocedure Patients)    Pearl Myrick RN

## 2022-08-29 ENCOUNTER — TRANSFERRED RECORDS (OUTPATIENT)
Dept: HEALTH INFORMATION MANAGEMENT | Facility: CLINIC | Age: 47
End: 2022-08-29

## 2022-08-29 LAB
ALT SERPL-CCNC: 18 IU/L (ref 0–32)
AST SERPL-CCNC: 15 IU/L (ref 0–40)
CREATININE (EXTERNAL): 0.63 MG/DL (ref 0.57–1)
GFR ESTIMATED (EXTERNAL): 110 ML/MIN/1.73
GLUCOSE (EXTERNAL): 94 MG/DL (ref 65–99)
POTASSIUM (EXTERNAL): 4.2 MMOL/L (ref 3.5–5.2)

## 2022-10-04 ENCOUNTER — HOSPITAL ENCOUNTER (OUTPATIENT)
Dept: ULTRASOUND IMAGING | Facility: HOSPITAL | Age: 47
Discharge: HOME | End: 2022-10-04
Attending: INTERNAL MEDICINE | Admitting: INTERNAL MEDICINE
Payer: COMMERCIAL

## 2022-10-04 DIAGNOSIS — B18.1 CHRONIC HEPATITIS B (H): ICD-10-CM

## 2022-10-04 PROCEDURE — 76705 ECHO EXAM OF ABDOMEN: CPT

## 2022-10-07 ENCOUNTER — TRANSFERRED RECORDS (OUTPATIENT)
Dept: HEALTH INFORMATION MANAGEMENT | Facility: CLINIC | Age: 47
End: 2022-10-07

## 2022-12-28 ENCOUNTER — OFFICE VISIT (OUTPATIENT)
Dept: FAMILY MEDICINE | Facility: CLINIC | Age: 47
End: 2022-12-28
Payer: COMMERCIAL

## 2022-12-28 VITALS
BODY MASS INDEX: 32.62 KG/M2 | OXYGEN SATURATION: 97 % | TEMPERATURE: 98.6 F | HEART RATE: 85 BPM | DIASTOLIC BLOOD PRESSURE: 84 MMHG | SYSTOLIC BLOOD PRESSURE: 110 MMHG | WEIGHT: 172.8 LBS | HEIGHT: 61 IN

## 2022-12-28 DIAGNOSIS — J45.40 MODERATE PERSISTENT ASTHMA WITHOUT COMPLICATION: ICD-10-CM

## 2022-12-28 DIAGNOSIS — G47.09 OTHER INSOMNIA: ICD-10-CM

## 2022-12-28 DIAGNOSIS — E78.5 HYPERLIPIDEMIA, UNSPECIFIED HYPERLIPIDEMIA TYPE: ICD-10-CM

## 2022-12-28 DIAGNOSIS — B18.1 CHRONIC VIRAL HEPATITIS B WITHOUT DELTA AGENT AND WITHOUT COMA (H): ICD-10-CM

## 2022-12-28 DIAGNOSIS — Z12.31 ENCOUNTER FOR SCREENING MAMMOGRAM FOR BREAST CANCER: ICD-10-CM

## 2022-12-28 DIAGNOSIS — S16.1XXA STRAIN OF NECK MUSCLE, INITIAL ENCOUNTER: ICD-10-CM

## 2022-12-28 DIAGNOSIS — Z23 NEED FOR IMMUNIZATION AGAINST INFLUENZA: ICD-10-CM

## 2022-12-28 DIAGNOSIS — Z76.89 ENCOUNTER TO ESTABLISH CARE: Primary | ICD-10-CM

## 2022-12-28 DIAGNOSIS — Z12.4 CERVICAL CANCER SCREENING: ICD-10-CM

## 2022-12-28 DIAGNOSIS — Z97.5 IUD (INTRAUTERINE DEVICE) IN PLACE: ICD-10-CM

## 2022-12-28 PROCEDURE — 99215 OFFICE O/P EST HI 40 MIN: CPT | Mod: 25 | Performed by: FAMILY MEDICINE

## 2022-12-28 PROCEDURE — 90471 IMMUNIZATION ADMIN: CPT | Performed by: FAMILY MEDICINE

## 2022-12-28 PROCEDURE — 90686 IIV4 VACC NO PRSV 0.5 ML IM: CPT | Performed by: FAMILY MEDICINE

## 2022-12-28 RX ORDER — ALBUTEROL SULFATE 90 UG/1
2 AEROSOL, METERED RESPIRATORY (INHALATION) EVERY 6 HOURS PRN
Qty: 8.5 G | Refills: 1 | Status: SHIPPED | OUTPATIENT
Start: 2022-12-28 | End: 2024-01-08

## 2022-12-28 RX ORDER — BUDESONIDE AND FORMOTEROL FUMARATE DIHYDRATE 80; 4.5 UG/1; UG/1
AEROSOL RESPIRATORY (INHALATION)
Qty: 10.2 G | Refills: 11 | Status: SHIPPED | OUTPATIENT
Start: 2022-12-28 | End: 2022-12-28

## 2022-12-28 RX ORDER — HYDROXYZINE HYDROCHLORIDE 25 MG/1
25 TABLET, FILM COATED ORAL EVERY 6 HOURS PRN
Qty: 45 TABLET | Refills: 0 | Status: SHIPPED | OUTPATIENT
Start: 2022-12-28 | End: 2024-07-08

## 2022-12-28 RX ORDER — BUDESONIDE AND FORMOTEROL FUMARATE DIHYDRATE 160; 4.5 UG/1; UG/1
2 AEROSOL RESPIRATORY (INHALATION) 2 TIMES DAILY
Qty: 10.2 G | Refills: 11 | Status: SHIPPED | OUTPATIENT
Start: 2022-12-28 | End: 2024-01-08

## 2022-12-28 RX ORDER — ACETAMINOPHEN 325 MG/1
650 TABLET ORAL EVERY 6 HOURS PRN
Qty: 100 TABLET | Refills: 3 | Status: SHIPPED | OUTPATIENT
Start: 2022-12-28 | End: 2024-07-08

## 2022-12-28 ASSESSMENT — ASTHMA QUESTIONNAIRES
QUESTION_5 LAST FOUR WEEKS HOW WOULD YOU RATE YOUR ASTHMA CONTROL: SOMEWHAT CONTROLLED
QUESTION_2 LAST FOUR WEEKS HOW OFTEN HAVE YOU HAD SHORTNESS OF BREATH: ONCE OR TWICE A WEEK
QUESTION_3 LAST FOUR WEEKS HOW OFTEN DID YOUR ASTHMA SYMPTOMS (WHEEZING, COUGHING, SHORTNESS OF BREATH, CHEST TIGHTNESS OR PAIN) WAKE YOU UP AT NIGHT OR EARLIER THAN USUAL IN THE MORNING: NOT AT ALL
QUESTION_4 LAST FOUR WEEKS HOW OFTEN HAVE YOU USED YOUR RESCUE INHALER OR NEBULIZER MEDICATION (SUCH AS ALBUTEROL): ONE OR TWO TIMES PER DAY
ACT_TOTALSCORE: 17
QUESTION_1 LAST FOUR WEEKS HOW MUCH OF THE TIME DID YOUR ASTHMA KEEP YOU FROM GETTING AS MUCH DONE AT WORK, SCHOOL OR AT HOME: SOME OF THE TIME
ACT_TOTALSCORE: 17

## 2023-03-28 ENCOUNTER — ANCILLARY PROCEDURE (OUTPATIENT)
Dept: MAMMOGRAPHY | Facility: CLINIC | Age: 48
End: 2023-03-28
Attending: FAMILY MEDICINE
Payer: COMMERCIAL

## 2023-03-28 ENCOUNTER — OFFICE VISIT (OUTPATIENT)
Dept: FAMILY MEDICINE | Facility: CLINIC | Age: 48
End: 2023-03-28
Payer: COMMERCIAL

## 2023-03-28 VITALS
WEIGHT: 136.75 LBS | OXYGEN SATURATION: 98 % | DIASTOLIC BLOOD PRESSURE: 76 MMHG | RESPIRATION RATE: 16 BRPM | TEMPERATURE: 98.2 F | HEIGHT: 61 IN | HEART RATE: 83 BPM | BODY MASS INDEX: 25.82 KG/M2 | SYSTOLIC BLOOD PRESSURE: 117 MMHG

## 2023-03-28 DIAGNOSIS — Z11.3 SCREEN FOR STD (SEXUALLY TRANSMITTED DISEASE): ICD-10-CM

## 2023-03-28 DIAGNOSIS — B18.1 CHRONIC VIRAL HEPATITIS B WITHOUT DELTA AGENT AND WITHOUT COMA (H): ICD-10-CM

## 2023-03-28 DIAGNOSIS — Z12.11 SCREENING FOR COLON CANCER: ICD-10-CM

## 2023-03-28 DIAGNOSIS — H53.8 BLURRED VISION: ICD-10-CM

## 2023-03-28 DIAGNOSIS — Z00.00 ROUTINE GENERAL MEDICAL EXAMINATION AT A HEALTH CARE FACILITY: Primary | ICD-10-CM

## 2023-03-28 DIAGNOSIS — Z12.31 ENCOUNTER FOR SCREENING MAMMOGRAM FOR BREAST CANCER: ICD-10-CM

## 2023-03-28 DIAGNOSIS — Z12.4 SCREENING FOR CERVICAL CANCER: ICD-10-CM

## 2023-03-28 DIAGNOSIS — Z97.5 IUD (INTRAUTERINE DEVICE) IN PLACE: ICD-10-CM

## 2023-03-28 LAB
CLUE CELLS: ABNORMAL
TRICHOMONAS, WET PREP: ABNORMAL
WBC'S/HIGH POWER FIELD, WET PREP: ABNORMAL
YEAST, WET PREP: PRESENT

## 2023-03-28 PROCEDURE — 87624 HPV HI-RISK TYP POOLED RSLT: CPT | Performed by: FAMILY MEDICINE

## 2023-03-28 PROCEDURE — G0145 SCR C/V CYTO,THINLAYER,RESCR: HCPCS | Performed by: FAMILY MEDICINE

## 2023-03-28 PROCEDURE — 87491 CHLMYD TRACH DNA AMP PROBE: CPT | Performed by: FAMILY MEDICINE

## 2023-03-28 PROCEDURE — 87210 SMEAR WET MOUNT SALINE/INK: CPT | Performed by: FAMILY MEDICINE

## 2023-03-28 PROCEDURE — 77067 SCR MAMMO BI INCL CAD: CPT | Mod: TC | Performed by: RADIOLOGY

## 2023-03-28 PROCEDURE — 87591 N.GONORRHOEAE DNA AMP PROB: CPT | Performed by: FAMILY MEDICINE

## 2023-03-28 PROCEDURE — 99396 PREV VISIT EST AGE 40-64: CPT | Performed by: FAMILY MEDICINE

## 2023-03-28 ASSESSMENT — ENCOUNTER SYMPTOMS
ARTHRALGIAS: 0
PARESTHESIAS: 0
EYE PAIN: 0
FEVER: 0
COUGH: 0
PALPITATIONS: 0
DYSURIA: 0
JOINT SWELLING: 0
FREQUENCY: 0
BREAST MASS: 0
SHORTNESS OF BREATH: 0
ABDOMINAL PAIN: 0
DIARRHEA: 0
HEMATOCHEZIA: 0
DIZZINESS: 0
HEMATURIA: 0
WEAKNESS: 0
CONSTIPATION: 0
HEARTBURN: 0
NAUSEA: 0
NERVOUS/ANXIOUS: 0
CHILLS: 0
HEADACHES: 0
SORE THROAT: 0
MYALGIAS: 0

## 2023-03-28 ASSESSMENT — ASTHMA QUESTIONNAIRES
QUESTION_1 LAST FOUR WEEKS HOW MUCH OF THE TIME DID YOUR ASTHMA KEEP YOU FROM GETTING AS MUCH DONE AT WORK, SCHOOL OR AT HOME: NONE OF THE TIME
QUESTION_3 LAST FOUR WEEKS HOW OFTEN DID YOUR ASTHMA SYMPTOMS (WHEEZING, COUGHING, SHORTNESS OF BREATH, CHEST TIGHTNESS OR PAIN) WAKE YOU UP AT NIGHT OR EARLIER THAN USUAL IN THE MORNING: NOT AT ALL
ACT_TOTALSCORE: 24
ACT_TOTALSCORE: 24
QUESTION_4 LAST FOUR WEEKS HOW OFTEN HAVE YOU USED YOUR RESCUE INHALER OR NEBULIZER MEDICATION (SUCH AS ALBUTEROL): NOT AT ALL
QUESTION_5 LAST FOUR WEEKS HOW WOULD YOU RATE YOUR ASTHMA CONTROL: WELL CONTROLLED
QUESTION_2 LAST FOUR WEEKS HOW OFTEN HAVE YOU HAD SHORTNESS OF BREATH: NOT AT ALL

## 2023-03-28 NOTE — PATIENT INSTRUCTIONS
Your IUD was placed 2/21/2014.   We need to replace it before 2/21/      Preventive Health Recommendations  Female Ages 40 to 49    Yearly exam:   See your health care provider every year in order to  Review health changes.   Discuss preventive care.    Review your medicines if your doctor prescribed any.    Get a Pap test every three years (unless you have an abnormal result and your provider advises testing more often).    If you get Pap tests with HPV test, you only need to test every 5 years, unless you have an abnormal result. You do not need a Pap test if your uterus was removed (hysterectomy) and you have not had cancer.    You should be tested each year for STDs (sexually transmitted diseases), if you're at risk.   Ask your doctor if you should have a mammogram.    Have a colonoscopy (test for colon cancer) if someone in your family has had colon cancer or polyps before age 50.     Have a cholesterol test every 5 years.     Have a diabetes test (fasting glucose) after age 45. If you are at risk for diabetes, you should have this test every 3 years.    Shots: Get a flu shot each year. Get a tetanus shot every 10 years.     Nutrition:   Eat at least 5 servings of fruits and vegetables each day.  Eat whole-grain bread, whole-wheat pasta and brown rice instead of white grains and rice.  Get adequate Calcium and Vitamin D.      Lifestyle  Exercise at least 150 minutes a week (an average of 30 minutes a day, 5 days a week). This will help you control your weight and prevent disease.  Limit alcohol to one drink per day.  No smoking.   Wear sunscreen to prevent skin cancer.  See your dentist every six months for an exam and cleaning.

## 2023-03-28 NOTE — PROGRESS NOTES
SUBJECTIVE:   CC: Baljeet is an 47 year old who presents for preventive health visit.   Additional Questions 12/28/2022   Roomed by cp       Healthy Habits:     Getting at least 3 servings of Calcium per day:  Yes    Bi-annual eye exam:  NO    Dental care twice a year:  NO    Sleep apnea or symptoms of sleep apnea:  None    Diet:  Regular (no restrictions)    Frequency of exercise:  None    Taking medications regularly:  Yes    Medication side effects:  Not applicable    PHQ-2 Total Score: 0    Additional concerns today:  Yes              Today's PHQ-2 Score:   PHQ-2 ( 1999 Pfizer) 3/28/2023   Q1: Little interest or pleasure in doing things 0   Q2: Feeling down, depressed or hopeless 0   PHQ-2 Score 0   PHQ-2 Total Score (12-17 Years)- Positive if 3 or more points; Administer PHQ-A if positive -   Q1: Little interest or pleasure in doing things Not at all   Q2: Feeling down, depressed or hopeless Not at all   PHQ-2 Score 0       Have you ever done Advance Care Planning? (For example, a Health Directive, POLST, or a discussion with a medical provider or your loved ones about your wishes): No, advance care planning information given to patient to review.  Patient plans to discuss their wishes with loved ones or provider.      Social History     Tobacco Use     Smoking status: Never     Passive exposure: Yes     Smokeless tobacco: Current     Types: Chew     Tobacco comments:      smokes outside. Chews Betel nut w/o tobacco.   Substance Use Topics     Alcohol use: Never         Alcohol Use 3/28/2023   Prescreen: >3 drinks/day or >7 drinks/week? No     Reviewed orders with patient.  Reviewed health maintenance and updated orders accordingly - Yes  Lab work is in process  Labs reviewed in EPIC    Breast Cancer Screening:  Any new diagnosis of family breast, ovarian, or bowel cancer? No    FHS-7:   Breast CA Risk Assessment (FHS-7) 9/27/2021 3/28/2023   Did any of your first-degree relatives have breast or ovarian  "cancer? No No   Did any of your relatives have bilateral breast cancer? No No   Did any man in your family have breast cancer? No No   Did any woman in your family have breast and ovarian cancer? No No   Did any woman in your family have breast cancer before age 50 y? No No   Do you have 2 or more relatives with breast and/or ovarian cancer? No No   Do you have 2 or more relatives with breast and/or bowel cancer? No No       Mammogram Screening: Recommended annual mammography  Pertinent mammograms are reviewed under the imaging tab.    History of abnormal Pap smear: NO - age 30-65 PAP every 5 years with negative HPV co-testing recommended     Reviewed and updated as needed this visit by clinical staff   Tobacco  Allergies  Meds  Problems  Med Hx  Surg Hx  Fam Hx          Reviewed and updated as needed this visit by Provider   Tobacco  Allergies  Meds  Problems  Med Hx  Surg Hx  Fam Hx             Review of Systems   Constitutional: Negative for chills and fever.   HENT: Negative for congestion, ear pain, hearing loss and sore throat.    Eyes: Negative for pain and visual disturbance.   Respiratory: Negative for cough and shortness of breath.    Cardiovascular: Negative for chest pain, palpitations and peripheral edema.   Gastrointestinal: Negative for abdominal pain, constipation, diarrhea, heartburn, hematochezia and nausea.   Breasts:  Negative for tenderness, breast mass and discharge.   Genitourinary: Negative for dysuria, frequency, genital sores, hematuria, pelvic pain, urgency, vaginal bleeding and vaginal discharge.   Musculoskeletal: Negative for arthralgias, joint swelling and myalgias.   Skin: Negative for rash.   Neurological: Negative for dizziness, weakness, headaches and paresthesias.   Psychiatric/Behavioral: Negative for mood changes. The patient is not nervous/anxious.           OBJECTIVE:   /76   Pulse 83   Temp 98.2  F (36.8  C) (Oral)   Resp 16   Ht 1.55 m (5' 1.02\")   " Wt 62 kg (136 lb 12 oz)   LMP 03/24/2023 (Approximate)   SpO2 98%   BMI 25.82 kg/m    Physical Exam  GENERAL: healthy, alert and no distress  EYES: Eyes grossly normal to inspection, PERRL and conjunctivae and sclerae normal  HENT: ear canals and TM's normal, nose and mouth without ulcers or lesions  NECK: no adenopathy, no asymmetry, masses, or scars and thyroid normal to palpation  RESP: lungs clear to auscultation - no rales, rhonchi or wheezes  BREAST: normal without masses, tenderness or nipple discharge and no palpable axillary masses or adenopathy  CV: regular rate and rhythm, normal S1 S2, no S3 or S4, no murmur, click or rub, no peripheral edema and peripheral pulses strong  ABDOMEN: soft, nontender, no hepatosplenomegaly, no masses and bowel sounds normal   (female): normal female external genitalia, normal urethral meatus, vaginal mucosa pink, moist, well rugated, and normal cervix/adnexa/uterus without masses or discharge  MS: no gross musculoskeletal defects noted, no edema  SKIN: no suspicious lesions or rashes  NEURO: Normal strength and tone, mentation intact and speech normal  PSYCH: mentation appears normal, affect normal/bright    Diagnostic Test Results:  Labs reviewed in Epic    ASSESSMENT/PLAN:   Say was seen today for physical.    Diagnoses and all orders for this visit:    Routine general medical examination at a health care facility    Screen for STD (sexually transmitted disease)  -     Wet prep - Clinic Collect  -     Chlamydia trachomatis PCR - Clinic Collect  -     Neisseria gonorrhoeae PCR - Clinic Collect    Screening for cervical cancer  -     PAP screen with HPV - recommended age 30 - 65 years  -     HPV Hold (Lab Only)    Screening for colon cancer  -     Fecal colorectal cancer screen FIT; Future  -     Fecal colorectal cancer screen FIT    Chronic viral hepatitis B without delta agent and without coma (H)  Managed by [facility], seen within past 6 months.     Blurred vision  -      "Adult Eye  Referral; Future    IUD (intrauterine device) in place  Strings checked, no issues. Due to be removed next year. Right now she says she does not want another one when this one expires.               COUNSELING:  Reviewed preventive health counseling, as reflected in patient instructions      BMI:   Estimated body mass index is 25.82 kg/m  as calculated from the following:    Height as of this encounter: 1.55 m (5' 1.02\").    Weight as of this encounter: 62 kg (136 lb 12 oz).         She reports that she has never smoked. She has been exposed to tobacco smoke. Her smokeless tobacco use includes chew.          Orly Carrington MD  [facility]  "

## 2023-03-29 LAB
C TRACH DNA SPEC QL NAA+PROBE: NEGATIVE
N GONORRHOEA DNA SPEC QL NAA+PROBE: NEGATIVE

## 2023-04-03 ENCOUNTER — TRANSFERRED RECORDS (OUTPATIENT)
Dept: HEALTH INFORMATION MANAGEMENT | Facility: CLINIC | Age: 48
End: 2023-04-03
Payer: COMMERCIAL

## 2023-04-03 LAB
ALT SERPL-CCNC: 20 IU/L (ref 0–32)
AST SERPL-CCNC: 22 IU/L (ref 0–40)
BKR LAB AP GYN ADEQUACY: NORMAL
BKR LAB AP GYN INTERPRETATION: NORMAL
BKR LAB AP HPV REFLEX: NORMAL
BKR LAB AP PREVIOUS ABNORMAL: NORMAL
CREATININE (EXTERNAL): 0.65 MG/DL (ref 0.57–1)
GFR ESTIMATED (EXTERNAL): 109 ML/MIN/1.73
GLUCOSE (EXTERNAL): 85 MG/DL (ref 70–99)
PATH REPORT.COMMENTS IMP SPEC: NORMAL
PATH REPORT.COMMENTS IMP SPEC: NORMAL
PATH REPORT.RELEVANT HX SPEC: NORMAL
POTASSIUM (EXTERNAL): 3.7 MMOL/L (ref 3.5–5.2)

## 2023-04-04 LAB
HUMAN PAPILLOMA VIRUS 16 DNA: NEGATIVE
HUMAN PAPILLOMA VIRUS 18 DNA: NEGATIVE
HUMAN PAPILLOMA VIRUS FINAL DIAGNOSIS: NORMAL
HUMAN PAPILLOMA VIRUS OTHER HR: NEGATIVE

## 2023-04-26 PROCEDURE — 82274 ASSAY TEST FOR BLOOD FECAL: CPT | Performed by: FAMILY MEDICINE

## 2023-04-29 LAB — HEMOCCULT STL QL IA: NEGATIVE

## 2023-09-18 ENCOUNTER — HOSPITAL ENCOUNTER (OUTPATIENT)
Dept: ULTRASOUND IMAGING | Facility: HOSPITAL | Age: 48
Discharge: HOME | End: 2023-09-18
Attending: INTERNAL MEDICINE | Admitting: INTERNAL MEDICINE
Payer: COMMERCIAL

## 2023-09-18 DIAGNOSIS — B18.1 CHRONIC HEPATITIS B (H): ICD-10-CM

## 2023-09-18 PROCEDURE — 76705 ECHO EXAM OF ABDOMEN: CPT

## 2023-09-19 ENCOUNTER — TRANSFERRED RECORDS (OUTPATIENT)
Dept: HEALTH INFORMATION MANAGEMENT | Facility: CLINIC | Age: 48
End: 2023-09-19
Payer: COMMERCIAL

## 2023-09-27 ENCOUNTER — PATIENT OUTREACH (OUTPATIENT)
Dept: CARE COORDINATION | Facility: CLINIC | Age: 48
End: 2023-09-27
Payer: COMMERCIAL

## 2023-09-27 NOTE — PROGRESS NOTES
Clinic Care Coordination Contact  Program:  [facility]: [place]  Renewal: [employer]   Date Applied:     ANAT Outreach:   9/27/23:  CTA called to see if patient needed assistance with their [employer] Renewal. Patient declined needing assistance and no follow up needed   Felecia Castaneda  Care   [facility]  Clinic Care Coordination  [phone]      Health Insurance:      Referral/Screening:

## 2024-01-03 ENCOUNTER — TRANSFERRED RECORDS (OUTPATIENT)
Dept: HEALTH INFORMATION MANAGEMENT | Facility: CLINIC | Age: 49
End: 2024-01-03
Payer: COMMERCIAL

## 2024-01-03 LAB
ALT SERPL-CCNC: 17 IU/L (ref 0–32)
AST SERPL-CCNC: 15 IU/L (ref 0–40)
CREATININE (EXTERNAL): 0.64 MG/DL (ref 0.57–1)
GFR ESTIMATED (EXTERNAL): 109 ML/MIN/1.73
GLUCOSE (EXTERNAL): 78 MG/DL (ref 70–99)
POTASSIUM (EXTERNAL): 4.1 MMOL/L (ref 3.5–5.2)

## 2024-01-08 ENCOUNTER — OFFICE VISIT (OUTPATIENT)
Dept: FAMILY MEDICINE | Facility: CLINIC | Age: 49
End: 2024-01-08
Payer: COMMERCIAL

## 2024-01-08 VITALS
DIASTOLIC BLOOD PRESSURE: 75 MMHG | HEIGHT: 61 IN | TEMPERATURE: 98.1 F | WEIGHT: 168.75 LBS | HEART RATE: 83 BPM | OXYGEN SATURATION: 95 % | BODY MASS INDEX: 31.86 KG/M2 | RESPIRATION RATE: 16 BRPM | SYSTOLIC BLOOD PRESSURE: 109 MMHG

## 2024-01-08 DIAGNOSIS — K21.9 GASTROESOPHAGEAL REFLUX DISEASE WITHOUT ESOPHAGITIS: ICD-10-CM

## 2024-01-08 DIAGNOSIS — Z23 IMMUNIZATION DUE: ICD-10-CM

## 2024-01-08 DIAGNOSIS — B18.1 CHRONIC VIRAL HEPATITIS B WITHOUT DELTA AGENT AND WITHOUT COMA (H): ICD-10-CM

## 2024-01-08 DIAGNOSIS — Z30.432 ENCOUNTER FOR REMOVAL OF INTRAUTERINE CONTRACEPTIVE DEVICE: ICD-10-CM

## 2024-01-08 DIAGNOSIS — Z97.5 IUD (INTRAUTERINE DEVICE) IN PLACE: Primary | ICD-10-CM

## 2024-01-08 DIAGNOSIS — J45.40 MODERATE PERSISTENT ASTHMA WITHOUT COMPLICATION: ICD-10-CM

## 2024-01-08 PROCEDURE — 11982 REMOVE DRUG IMPLANT DEVICE: CPT | Performed by: FAMILY MEDICINE

## 2024-01-08 PROCEDURE — 99213 OFFICE O/P EST LOW 20 MIN: CPT | Mod: 25 | Performed by: FAMILY MEDICINE

## 2024-01-08 RX ORDER — FAMOTIDINE 20 MG/1
20 TABLET, FILM COATED ORAL 2 TIMES DAILY PRN
Qty: 120 TABLET | Refills: 3 | Status: SHIPPED | OUTPATIENT
Start: 2024-01-08 | End: 2024-07-08

## 2024-01-08 RX ORDER — BUDESONIDE AND FORMOTEROL FUMARATE DIHYDRATE 80; 4.5 UG/1; UG/1
2 AEROSOL RESPIRATORY (INHALATION) 2 TIMES DAILY
Qty: 10.2 G | Refills: 11 | Status: SHIPPED | OUTPATIENT
Start: 2024-01-08

## 2024-01-08 RX ORDER — ALBUTEROL SULFATE 90 UG/1
2 AEROSOL, METERED RESPIRATORY (INHALATION) EVERY 6 HOURS PRN
Qty: 8.5 G | Refills: 1 | Status: SHIPPED | OUTPATIENT
Start: 2024-01-08 | End: 2024-07-08

## 2024-01-08 ASSESSMENT — ASTHMA QUESTIONNAIRES
QUESTION_1 LAST FOUR WEEKS HOW MUCH OF THE TIME DID YOUR ASTHMA KEEP YOU FROM GETTING AS MUCH DONE AT WORK, SCHOOL OR AT HOME: NONE OF THE TIME
ACT_TOTALSCORE: 24
QUESTION_4 LAST FOUR WEEKS HOW OFTEN HAVE YOU USED YOUR RESCUE INHALER OR NEBULIZER MEDICATION (SUCH AS ALBUTEROL): NOT AT ALL
QUESTION_5 LAST FOUR WEEKS HOW WOULD YOU RATE YOUR ASTHMA CONTROL: WELL CONTROLLED
QUESTION_3 LAST FOUR WEEKS HOW OFTEN DID YOUR ASTHMA SYMPTOMS (WHEEZING, COUGHING, SHORTNESS OF BREATH, CHEST TIGHTNESS OR PAIN) WAKE YOU UP AT NIGHT OR EARLIER THAN USUAL IN THE MORNING: NOT AT ALL
QUESTION_2 LAST FOUR WEEKS HOW OFTEN HAVE YOU HAD SHORTNESS OF BREATH: NOT AT ALL
ACT_TOTALSCORE: 24

## 2024-01-08 NOTE — PROGRESS NOTES
Baljeet was seen today for contraception.    Diagnoses and all orders for this visit:    IUD (intrauterine device) in place  -     REMOVAL NON-BIODEGRADABLE DRUG DELIVERY IMPLANT    Chronic viral hepatitis B without delta agent and without coma (H)  Follows with GI, labs done 2023. She has follow up with them scheduled, on entecavir.     Immunization due  Declined all immunizations including covid, flu, hep A    Moderate persistent asthma without complication  Has not had a controller inhaler in a while - appears to be covered by insurance. Will refill and follow up in 3-6 months to discuss efficac.   -     albuterol (PROAIR HFA/PROVENTIL HFA/VENTOLIN HFA) 108 (90 Base) MCG/ACT inhaler; Inhale 2 puffs into the lungs every 6 hours as needed for shortness of breath or wheezing  -     budesonide-formoterol (SYMBICORT) 80-4.5 MCG/ACT Inhaler; Inhale 2 puffs into the lungs 2 times daily    Gastroesophageal reflux disease without esophagitis  -     famotidine (PEPCID) 20 MG tablet; Take 1 tablet (20 mg) by mouth 2 times daily as needed (acid reflux)    Other orders  -     REVIEW OF HEALTH MAINTENANCE PROTOCOL ORDERS  -     PRIMARY CARE FOLLOW-UP SCHEDULING; Future            IUD Removal:  SUBJECTIVE:    Is a pregnancy test required: No.  Was a consent obtained?  Yes    Say Berto is a 48 year old female,, No LMP recorded. (Menstrual status: IUD). who presents today for IUD removal. Her current IUD was placed 10 ago. She has not had problems with the IUD. She requests removal of the IUD because the IUD effectiveness has     Today's PHQ-2 Score:       3/28/2023     1:22 PM   PHQ-2 (  Pfizer)   Q1: Little interest or pleasure in doing things 0   Q2: Feeling down, depressed or hopeless 0   PHQ-2 Score 0   Q1: Little interest or pleasure in doing things Not at all   Q2: Feeling down, depressed or hopeless Not at all   PHQ-2 Score 0       PROCEDURE:    A speculum exam was performed and the cervix was visualized.  The IUD string was visualized. Using ring forceps, the string  was grasped and the IUD removed intact.    POST PROCEDURE:    The patient tolerated the procedure well. Patient was discharged in stable condition.    Call if bleeding, pain or fever occur., Birth control counseling given., and she declined any birth control    Orly Carrington MD

## 2024-01-10 ENCOUNTER — MEDICAL CORRESPONDENCE (OUTPATIENT)
Dept: SCHEDULING | Facility: CLINIC | Age: 49
End: 2024-01-10
Payer: COMMERCIAL

## 2024-03-05 ENCOUNTER — HOSPITAL ENCOUNTER (OUTPATIENT)
Dept: ULTRASOUND IMAGING | Facility: HOSPITAL | Age: 49
Discharge: HOME | End: 2024-03-05
Attending: INTERNAL MEDICINE | Admitting: INTERNAL MEDICINE
Payer: COMMERCIAL

## 2024-03-05 DIAGNOSIS — B18.1 CHRONIC VIRAL HEPATITIS B WITHOUT DELTA AGENT AND WITHOUT COMA (H): ICD-10-CM

## 2024-03-05 PROCEDURE — 76705 ECHO EXAM OF ABDOMEN: CPT

## 2024-03-07 ENCOUNTER — TRANSFERRED RECORDS (OUTPATIENT)
Dept: HEALTH INFORMATION MANAGEMENT | Facility: CLINIC | Age: 49
End: 2024-03-07
Payer: COMMERCIAL

## 2024-07-08 ENCOUNTER — OFFICE VISIT (OUTPATIENT)
Dept: FAMILY MEDICINE | Facility: CLINIC | Age: 49
End: 2024-07-08
Attending: FAMILY MEDICINE
Payer: COMMERCIAL

## 2024-07-08 VITALS
DIASTOLIC BLOOD PRESSURE: 78 MMHG | TEMPERATURE: 98.9 F | HEIGHT: 61 IN | HEART RATE: 88 BPM | WEIGHT: 165.12 LBS | BODY MASS INDEX: 31.18 KG/M2 | OXYGEN SATURATION: 97 % | SYSTOLIC BLOOD PRESSURE: 119 MMHG | RESPIRATION RATE: 16 BRPM

## 2024-07-08 DIAGNOSIS — Z12.11 SCREEN FOR COLON CANCER: ICD-10-CM

## 2024-07-08 DIAGNOSIS — G47.09 OTHER INSOMNIA: ICD-10-CM

## 2024-07-08 DIAGNOSIS — Z00.00 ROUTINE GENERAL MEDICAL EXAMINATION AT A HEALTH CARE FACILITY: Primary | ICD-10-CM

## 2024-07-08 DIAGNOSIS — E78.5 HYPERLIPIDEMIA, UNSPECIFIED HYPERLIPIDEMIA TYPE: ICD-10-CM

## 2024-07-08 DIAGNOSIS — B18.1 CHRONIC VIRAL HEPATITIS B WITHOUT DELTA AGENT AND WITHOUT COMA (H): ICD-10-CM

## 2024-07-08 DIAGNOSIS — S16.1XXA STRAIN OF NECK MUSCLE, INITIAL ENCOUNTER: ICD-10-CM

## 2024-07-08 DIAGNOSIS — J45.40 MODERATE PERSISTENT ASTHMA WITHOUT COMPLICATION: ICD-10-CM

## 2024-07-08 PROCEDURE — 99396 PREV VISIT EST AGE 40-64: CPT | Performed by: FAMILY MEDICINE

## 2024-07-08 PROCEDURE — 99214 OFFICE O/P EST MOD 30 MIN: CPT | Mod: 25 | Performed by: FAMILY MEDICINE

## 2024-07-08 RX ORDER — ALBUTEROL SULFATE 90 UG/1
2 AEROSOL, METERED RESPIRATORY (INHALATION) EVERY 6 HOURS PRN
Qty: 8.5 G | Refills: 1 | Status: SHIPPED | OUTPATIENT
Start: 2024-07-08

## 2024-07-08 RX ORDER — ACETAMINOPHEN 325 MG/1
650 TABLET ORAL EVERY 6 HOURS PRN
Qty: 100 TABLET | Refills: 3 | Status: SHIPPED | OUTPATIENT
Start: 2024-07-08

## 2024-07-08 RX ORDER — HYDROXYZINE HYDROCHLORIDE 25 MG/1
25 TABLET, FILM COATED ORAL
Qty: 60 TABLET | Refills: 5 | Status: SHIPPED | OUTPATIENT
Start: 2024-07-08

## 2024-07-08 RX ORDER — DILTIAZEM HYDROCHLORIDE 60 MG/1
2 TABLET, FILM COATED ORAL 2 TIMES DAILY
Qty: 10.2 G | Refills: 11 | Status: SHIPPED | OUTPATIENT
Start: 2024-07-08

## 2024-07-08 RX ORDER — ENTECAVIR 0.5 MG/1
0.5 TABLET, FILM COATED ORAL DAILY
Qty: 90 TABLET | Refills: 3 | Status: SHIPPED | OUTPATIENT
Start: 2024-07-08

## 2024-07-08 SDOH — HEALTH STABILITY: PHYSICAL HEALTH: ON AVERAGE, HOW MANY MINUTES DO YOU ENGAGE IN EXERCISE AT THIS LEVEL?: 20 MIN

## 2024-07-08 SDOH — HEALTH STABILITY: PHYSICAL HEALTH: ON AVERAGE, HOW MANY DAYS PER WEEK DO YOU ENGAGE IN MODERATE TO STRENUOUS EXERCISE (LIKE A BRISK WALK)?: 2 DAYS

## 2024-07-08 ASSESSMENT — ASTHMA QUESTIONNAIRES
ACT_TOTALSCORE: 23
QUESTION_4 LAST FOUR WEEKS HOW OFTEN HAVE YOU USED YOUR RESCUE INHALER OR NEBULIZER MEDICATION (SUCH AS ALBUTEROL): ONCE A WEEK OR LESS
QUESTION_2 LAST FOUR WEEKS HOW OFTEN HAVE YOU HAD SHORTNESS OF BREATH: NOT AT ALL
ACT_TOTALSCORE: 23
QUESTION_3 LAST FOUR WEEKS HOW OFTEN DID YOUR ASTHMA SYMPTOMS (WHEEZING, COUGHING, SHORTNESS OF BREATH, CHEST TIGHTNESS OR PAIN) WAKE YOU UP AT NIGHT OR EARLIER THAN USUAL IN THE MORNING: NOT AT ALL
QUESTION_5 LAST FOUR WEEKS HOW WOULD YOU RATE YOUR ASTHMA CONTROL: WELL CONTROLLED
QUESTION_1 LAST FOUR WEEKS HOW MUCH OF THE TIME DID YOUR ASTHMA KEEP YOU FROM GETTING AS MUCH DONE AT WORK, SCHOOL OR AT HOME: NONE OF THE TIME

## 2024-07-08 ASSESSMENT — SOCIAL DETERMINANTS OF HEALTH (SDOH): HOW OFTEN DO YOU GET TOGETHER WITH FRIENDS OR RELATIVES?: NEVER

## 2024-07-08 NOTE — PATIENT INSTRUCTIONS
Patient Education   Preventive Care Advice   This is general advice given by our system to help you stay healthy. However, your care team may have specific advice just for you. Please talk to your care team about your preventive care needs.  Nutrition  Eat 5 or more servings of fruits and vegetables each day.  Try wheat bread, brown rice and whole grain pasta (instead of white bread, rice, and pasta).  Get enough calcium and vitamin D. Check the label on foods and aim for 100% of the RDA (recommended daily allowance).  Lifestyle  Exercise at least 150 minutes each week  (30 minutes a day, 5 days a week).  Do muscle strengthening activities 2 days a week. These help control your weight and prevent disease.  No smoking.  Wear sunscreen to prevent skin cancer.  Have a dental exam and cleaning every 6 months.  Yearly exams  See your health care team every year to talk about:  Any changes in your health.  Any medicines your care team has prescribed.  Preventive care, family planning, and ways to prevent chronic diseases.  Shots (vaccines)   HPV shots (up to age 26), if you've never had them before.  Hepatitis B shots (up to age 59), if you've never had them before.  COVID-19 shot: Get this shot when it's due.  Flu shot: Get a flu shot every year.  Tetanus shot: Get a tetanus shot every 10 years.  Pneumococcal, hepatitis A, and RSV shots: Ask your care team if you need these based on your risk.  Shingles shot (for age 50 and up)  General health tests  Diabetes screening:  Starting at age 35, Get screened for diabetes at least every 3 years.  If you are younger than age 35, ask your care team if you should be screened for diabetes.  Cholesterol test: At age 39, start having a cholesterol test every 5 years, or more often if advised.  Bone density scan (DEXA): At age 50, ask your care team if you should have this scan for osteoporosis (brittle bones).  Hepatitis C: Get tested at least once in your life.  STIs (sexually  transmitted infections)  Before age 24: Ask your care team if you should be screened for STIs.  After age 24: Get screened for STIs if you're at risk. You are at risk for STIs (including HIV) if:  You are sexually active with more than one person.  You don't use condoms every time.  You or a partner was diagnosed with a sexually transmitted infection.  If you are at risk for HIV, ask about PrEP medicine to prevent HIV.  Get tested for HIV at least once in your life, whether you are at risk for HIV or not.  Cancer screening tests  Cervical cancer screening: If you have a cervix, begin getting regular cervical cancer screening tests starting at age 21.  Breast cancer scan (mammogram): If you've ever had breasts, begin having regular mammograms starting at age 40. This is a scan to check for breast cancer.  Colon cancer screening: It is important to start screening for colon cancer at age 45.  Have a colonoscopy test every 10 years (or more often if you're at risk) Or, ask your provider about stool tests like a FIT test every year or Cologuard test every 3 years.  To learn more about your testing options, visit:   .  For help making a decision, visit:   https://bit.ly/wx71548.  Prostate cancer screening test: If you have a prostate, ask your care team if a prostate cancer screening test (PSA) at age 55 is right for you.  Lung cancer screening: If you are a current or former smoker ages 50 to 80, ask your care team if ongoing lung cancer screenings are right for you.  For informational purposes only. Not to replace the advice of your health care provider. Copyright   2023 [facility]. All rights reserved. Clinically reviewed by the [facility] Transitions Program. [employer] 930668 - REV 01/24.  Relationships for Good Health  Relationships are important for our health and happiness. Social isolation, loneliness and lack of support are bad for your health. Studies show that loneliness can harm health  and limit your life span as much as high blood pressure and smoking.   Take some time to reflect on your relationships. Then answer these questions:  Are there people in your life that cause you stress or drain your energy? What can you do to set limits?  ________________________________________________________________________________________________________________________________________________________________________________________________________________________________________________________________________________________________________________________________________________  Who do you enjoy spending time with? Who can you go to for support?  ________________________________________________________________________________________________________________________________________________________________________________________________________________________________________________________________________________________________________________________________________________  What can you do to improve your relationships with others?  __________________________________________________________________________________________________________________________________________________________________________________________________________________  ______________________________________________________________________________________________________________________________  What do you like most about your relationships with others?  ________________________________________________________________________________________________________________________________________________________________________________________________________________________________________________________________________________________________________________________________________________  My goal: ______________________________________________________________________  I will  ______________________________________________________________________________________________________________________________________________________________________________________________    For informational purposes only. Not to replace the advice of your health care provider. Copyright   2018 [facility]. All rights reserved. Clinically reviewed by [facility]  Team. Jose [id number] - Rev 04/21.

## 2024-07-08 NOTE — PROGRESS NOTES
"Preventive Care Visit  [facility], Family Medicine  Jul 8, 2024      Assessment & Plan     Routine general medical examination at a health care facility  Declined lipid - will see if we can get it drawn with her GI labs.     Moderate persistent asthma without complication  Refilled - ran out a while ago but breathing is better in the summer  - albuterol (PROAIR HFA/PROVENTIL HFA/VENTOLIN HFA) 108 (90 Base) MCG/ACT inhaler  Dispense: 8.5 g; Refill: 1  - SYMBICORT 80-4.5 MCG/ACT Inhaler  Dispense: 10.2 g; Refill: 11    Other insomnia  Patient prefers to continue. Discussed other options but she declined. Only take PRN  - hydrOXYzine HCl (ATARAX) 25 MG tablet  Dispense: 60 tablet; Refill: 5    Hyperlipidemia, unspecified hyperlipidemia type  Deferred lipid check to next visit.     Screen for colon cancer  - Fecal colorectal cancer screen FIT - Future (S+30)    Chronic viral hepatitis B without delta agent and without coma (H)  Said she wasn't able to get refills. Will send again - see GI notes. They did tell her that she should be able to get a refill every 3 months.   - entecavir (BARACLUDE) 0.5 MG tablet  Dispense: 90 tablet; Refill: 3    Strain of neck muscle, initial encounter  - acetaminophen (TYLENOL) 325 MG tablet  Dispense: 100 tablet; Refill: 3      Sleeping has been difficult when she is thinking too much. Only takes hydroxyzine for it and only as needed. Ok to refill.     Mood is ok right now.       BMI  Estimated body mass index is 31.3 kg/m  as calculated from the following:    Height as of this encounter: 1.547 m (5' 0.91\").    Weight as of this encounter: 74.9 kg (165 lb 1.9 oz).   Weight management plan: Discussed healthy diet and exercise guidelines    Counseling  Appropriate preventive services were discussed with this patient, including applicable screening as appropriate for fall prevention, nutrition, physical activity, Tobacco-use cessation, weight loss and " cognition.  Checklist reviewing preventive services available has been given to the patient.  Reviewed patient's diet, addressing concerns and/or questions.   She is at risk for lack of exercise and has been provided with information to increase physical activity for the benefit of her well-being.   Patient is at risk for social isolation and has been provided with information about the benefit of social connection.   She is at risk for psychosocial distress and has been provided with information to reduce risk.       Return in about 5 months (around 12/8/2024) for sleep, Medication Check.      Subjective   Say is a 48 year old, presenting for the following:  Physical, trouble falling a sleep, Medication Request (Sleeping pill), and Depression        7/8/2024    11:39 AM   Additional Questions   Roomed by silvio kat   Accompanied by self        Health Care Directive  Patient does not have a Health Care Directive or Living Will: Discussed advance care planning with patient; however, patient declined at this time.    HPI          7/8/2024   General Health   How would you rate your overall physical health? Good   Feel stress (tense, anxious, or unable to sleep) Only a little      (!) STRESS CONCERN      7/8/2024   Nutrition   Three or more servings of calcium each day? Yes   Diet: Regular (no restrictions)   How many servings of fruit and vegetables per day? 4 or more   How many sweetened beverages each day? 0-1            7/8/2024   Exercise   Days per week of moderate/strenous exercise 2 days   Average minutes spent exercising at this level 20 min      (!) EXERCISE CONCERN      7/8/2024   Social Factors   Frequency of gathering with friends or relatives Never   Worry food won't last until get money to buy more No   Food not last or not have enough money for food? No   Do you have housing? (Housing is defined as stable permanent housing and does not include staying ouside in a car, in a tent, in an abandoned building, in  an overnight shelter, or couch-surfing.) Yes   Are you worried about losing your housing? No   Lack of transportation? Yes   Unable to get utilities (heat,electricity)? No       (!) TRANSPORTATION CONCERN PRESENT(!) SOCIAL CONNECTIONS CONCERN      7/8/2024   Dental   Dentist two times every year? Yes            7/8/2024   TB Screening   Were you born outside of the [place]? Yes              Today's PHQ-2 Score:       1/8/2024    12:50 PM   PHQ-2 ( 1999 Pfizer)   Q1: Little interest or pleasure in doing things 0   Q2: Feeling down, depressed or hopeless 0   PHQ-2 Score 0   Q1: Little interest or pleasure in doing things Not at all   Q2: Feeling down, depressed or hopeless Not at all   PHQ-2 Score 0         7/8/2024   Substance Use   Alcohol more than 3/day or more than 7/wk No   Do you use any other substances recreationally? No        Social History     Tobacco Use    Smoking status: Never     Passive exposure: Past ( use to smoke when they were together but not anymore.)    Smokeless tobacco: Current     Types: Chew    Tobacco comments:     Pt chews betel nut w/o tobacco.   Vaping Use    Vaping status: Never Used   Substance Use Topics    Alcohol use: Never    Drug use: Never             7/8/2024   Breast Cancer Screening   Family history of breast, colon, or ovarian cancer? No / Unknown          3/28/2023   LAST FHS-7 RESULTS   1st degree relative breast or ovarian cancer No   Any relative bilateral breast cancer No   Any male have breast cancer No   Any ONE woman have BOTH breast AND ovarian cancer No   Any woman with breast cancer before 50yrs No   2 or more relatives with breast AND/OR ovarian cancer No   2 or more relatives with breast AND/OR bowel cancer No           Mammogram Screening - Mammogram every 1-2 years updated in Health Maintenance based on mutual decision making        7/8/2024   STI Screening   New sexual partner(s) since last STI/HIV test? No        History of abnormal Pap smear: No - age 30-  "64 PAP with HPV every 5 years recommended        Latest Ref Rng & Units 3/28/2023     1:34 PM   PAP / HPV   PAP  Negative for Intraepithelial Lesion or Malignancy (NILM)    HPV 16 DNA Negative Negative    HPV 18 DNA Negative Negative    Other HR HPV Negative Negative      ASCVD Risk   The 10-year ASCVD risk score (Stephen GALVAN, et al., 2019) is: 1.4%    Values used to calculate the score:      Age: 48 years      Sex: Female      Is Non- : No      Diabetic: No      Tobacco smoker: No      Systolic Blood Pressure: 119 mmHg      Is BP treated: No      HDL Cholesterol: 42 mg/dL      Total Cholesterol: 207 mg/dL        7/8/2024   Contraception/Family Planning   Questions about contraception or family planning No           Reviewed and updated as needed this visit by Provider   Tobacco  Allergies  Meds  Problems  Med Hx  Surg Hx  Fam Hx                     Objective    Exam  /78   Pulse 88   Temp 98.9  F (37.2  C) (Oral)   Resp 16   Ht 1.547 m (5' 0.91\")   Wt 74.9 kg (165 lb 1.9 oz)   LMP 07/03/2024 (Exact Date)   SpO2 97%   BMI 31.30 kg/m     Estimated body mass index is 31.3 kg/m  as calculated from the following:    Height as of this encounter: 1.547 m (5' 0.91\").    Weight as of this encounter: 74.9 kg (165 lb 1.9 oz).    Physical Exam  GENERAL: alert and no distress  NECK: no adenopathy, no asymmetry, masses, or scars  RESP: lungs clear to auscultation - no rales, rhonchi or wheezes  CV: regular rate and rhythm, normal S1 S2, no S3 or S4, no murmur, click or rub, no peripheral edema  ABDOMEN: soft, nontender, no hepatosplenomegaly, no masses and bowel sounds normal  MS: no gross musculoskeletal defects noted, no edema        Signed Electronically by: Orly Carrington MD    "

## 2024-07-29 ENCOUNTER — PATIENT OUTREACH (OUTPATIENT)
Dept: CARE COORDINATION | Facility: CLINIC | Age: 49
End: 2024-07-29
Payer: COMMERCIAL

## 2025-07-29 ENCOUNTER — PATIENT OUTREACH (OUTPATIENT)
Dept: CARE COORDINATION | Facility: CLINIC | Age: 50
End: 2025-07-29

## 2025-08-10 ENCOUNTER — HEALTH MAINTENANCE LETTER (OUTPATIENT)
Age: 50
End: 2025-08-10